# Patient Record
Sex: MALE | Race: WHITE | NOT HISPANIC OR LATINO | Employment: OTHER | URBAN - METROPOLITAN AREA
[De-identification: names, ages, dates, MRNs, and addresses within clinical notes are randomized per-mention and may not be internally consistent; named-entity substitution may affect disease eponyms.]

---

## 2023-06-20 ENCOUNTER — TELEPHONE (OUTPATIENT)
Dept: PRIMARY CARE | Facility: CLINIC | Age: 38
End: 2023-06-20

## 2023-06-20 NOTE — TELEPHONE ENCOUNTER
Spoke to patient and informed him that a faster option would be to go to [facility] care. I informed them of their services and told him it would be faster than making an appointment. Patient was in understanding and had no other questions or concerns. Patient was identified with full name and date of birth.       Emelina Smith MA

## 2023-06-20 NOTE — TELEPHONE ENCOUNTER
Problem: Non-Pressure Injury Wound  Goal: # Verbalizes understanding of wound and wound care  Description: If abnormality is a skin tear, avoid using tape on skin including transparent dressings. Document education using the patient education activity.  Outcome: Outcome Met, Continue evaluating goal progress toward completion     Problem: Non-Pressure Injury Wound  Goal: Participates in wound care activities  Outcome: Outcome Met, Continue evaluating goal progress toward completion     Problem: At Risk for Falls  Goal: # Patient does not fall  Outcome: Outcome Met, Continue evaluating goal progress toward completion     Problem: Activity Intolerance  Goal: # Tolerates activity for d/c setting with no clinical problems  Outcome: Outcome Met, Continue evaluating goal progress toward completion     Problem: Impaired Physical Mobility  Goal: # Bed mobility, ambulation, and ADLs are maintained or returned to baseline during hospitalization  Outcome: Outcome Met, Continue evaluating goal progress toward completion      Patient asking for referral for left wrist pain following weight training he would like to see an orthopedic or whomever [facility] would suggest to help. Patient states he does not need appointment, [facility] will just give him referral.    Please call and advise when in the system [phone]

## 2023-09-26 ENCOUNTER — OFFICE VISIT (OUTPATIENT)
Dept: PRIMARY CARE | Facility: CLINIC | Age: 38
End: 2023-09-26
Payer: COMMERCIAL

## 2023-09-26 DIAGNOSIS — Z23 ENCOUNTER FOR IMMUNIZATION: ICD-10-CM

## 2023-09-26 PROCEDURE — 90686 IIV4 VACC NO PRSV 0.5 ML IM: CPT | Performed by: INTERNAL MEDICINE

## 2023-09-26 PROCEDURE — 90471 IMMUNIZATION ADMIN: CPT | Performed by: INTERNAL MEDICINE

## 2023-09-26 NOTE — PROGRESS NOTES
Patient came into to get his Flu vaccine. Patient got it in the right arm and had no complaints.  Patient was able to get up and walk out of the office with no issues.      Emelina Smith MA

## 2024-03-23 ENCOUNTER — APPOINTMENT (OUTPATIENT)
Dept: RADIOLOGY | Facility: HOSPITAL | Age: 39
End: 2024-03-23
Payer: COMMERCIAL

## 2024-03-23 ENCOUNTER — HOSPITAL ENCOUNTER (OUTPATIENT)
Facility: HOSPITAL | Age: 39
Setting detail: OBSERVATION
Discharge: HOME | End: 2024-03-24
Attending: GENERAL PRACTICE | Admitting: INTERNAL MEDICINE
Payer: COMMERCIAL

## 2024-03-23 DIAGNOSIS — H53.2 DIPLOPIA: Primary | ICD-10-CM

## 2024-03-23 LAB
ALBUMIN SERPL BCP-MCNC: 4.7 G/DL (ref 3.4–5)
ALP SERPL-CCNC: 62 U/L (ref 33–120)
ALT SERPL W P-5'-P-CCNC: 44 U/L (ref 10–52)
ANION GAP SERPL CALC-SCNC: 13 MMOL/L (ref 10–20)
AST SERPL W P-5'-P-CCNC: 45 U/L (ref 9–39)
BASOPHILS # BLD AUTO: 0.05 X10*3/UL (ref 0–0.1)
BASOPHILS NFR BLD AUTO: 1.2 %
BILIRUB SERPL-MCNC: 1.1 MG/DL (ref 0–1.2)
BUN SERPL-MCNC: 14 MG/DL (ref 6–23)
CALCIUM SERPL-MCNC: 9.1 MG/DL (ref 8.6–10.3)
CHLORIDE SERPL-SCNC: 101 MMOL/L (ref 98–107)
CO2 SERPL-SCNC: 25 MMOL/L (ref 21–32)
CREAT SERPL-MCNC: 0.97 MG/DL (ref 0.5–1.3)
CRP SERPL-MCNC: <0.1 MG/DL
EGFRCR SERPLBLD CKD-EPI 2021: >90 ML/MIN/1.73M*2
EOSINOPHIL # BLD AUTO: 0.12 X10*3/UL (ref 0–0.7)
EOSINOPHIL NFR BLD AUTO: 2.8 %
ERYTHROCYTE [DISTWIDTH] IN BLOOD BY AUTOMATED COUNT: 12.9 % (ref 11.5–14.5)
ERYTHROCYTE [SEDIMENTATION RATE] IN BLOOD BY WESTERGREN METHOD: 24 MM/H (ref 0–15)
GLUCOSE SERPL-MCNC: 83 MG/DL (ref 74–99)
HCT VFR BLD AUTO: 44 % (ref 41–52)
HGB BLD-MCNC: 14.4 G/DL (ref 13.5–17.5)
IMM GRANULOCYTES # BLD AUTO: 0.01 X10*3/UL (ref 0–0.7)
IMM GRANULOCYTES NFR BLD AUTO: 0.2 % (ref 0–0.9)
LYMPHOCYTES # BLD AUTO: 1.69 X10*3/UL (ref 1.2–4.8)
LYMPHOCYTES NFR BLD AUTO: 39.8 %
MCH RBC QN AUTO: 27.7 PG (ref 26–34)
MCHC RBC AUTO-ENTMCNC: 32.7 G/DL (ref 32–36)
MCV RBC AUTO: 85 FL (ref 80–100)
MONOCYTES # BLD AUTO: 0.34 X10*3/UL (ref 0.1–1)
MONOCYTES NFR BLD AUTO: 8 %
NEUTROPHILS # BLD AUTO: 2.04 X10*3/UL (ref 1.2–7.7)
NEUTROPHILS NFR BLD AUTO: 48 %
NRBC BLD-RTO: 0 /100 WBCS (ref 0–0)
PLATELET # BLD AUTO: 279 X10*3/UL (ref 150–450)
POTASSIUM SERPL-SCNC: 3.9 MMOL/L (ref 3.5–5.3)
PROT SERPL-MCNC: 8.3 G/DL (ref 6.4–8.2)
RBC # BLD AUTO: 5.19 X10*6/UL (ref 4.5–5.9)
SODIUM SERPL-SCNC: 135 MMOL/L (ref 136–145)
WBC # BLD AUTO: 4.3 X10*3/UL (ref 4.4–11.3)

## 2024-03-23 PROCEDURE — 99222 1ST HOSP IP/OBS MODERATE 55: CPT | Performed by: INTERNAL MEDICINE

## 2024-03-23 PROCEDURE — 99285 EMERGENCY DEPT VISIT HI MDM: CPT | Mod: 25

## 2024-03-23 PROCEDURE — G0378 HOSPITAL OBSERVATION PER HR: HCPCS

## 2024-03-23 PROCEDURE — 2550000001 HC RX 255 CONTRASTS: Performed by: GENERAL PRACTICE

## 2024-03-23 PROCEDURE — 36415 COLL VENOUS BLD VENIPUNCTURE: CPT | Performed by: GENERAL PRACTICE

## 2024-03-23 PROCEDURE — 1100000001 HC PRIVATE ROOM DAILY

## 2024-03-23 PROCEDURE — 85652 RBC SED RATE AUTOMATED: CPT | Performed by: GENERAL PRACTICE

## 2024-03-23 PROCEDURE — 2500000004 HC RX 250 GENERAL PHARMACY W/ HCPCS (ALT 636 FOR OP/ED): Mod: JZ | Performed by: INTERNAL MEDICINE

## 2024-03-23 PROCEDURE — 85025 COMPLETE CBC W/AUTO DIFF WBC: CPT | Performed by: GENERAL PRACTICE

## 2024-03-23 PROCEDURE — 96374 THER/PROPH/DIAG INJ IV PUSH: CPT

## 2024-03-23 PROCEDURE — 86140 C-REACTIVE PROTEIN: CPT | Performed by: GENERAL PRACTICE

## 2024-03-23 PROCEDURE — 70470 CT HEAD/BRAIN W/O & W/DYE: CPT

## 2024-03-23 PROCEDURE — 80053 COMPREHEN METABOLIC PANEL: CPT | Performed by: GENERAL PRACTICE

## 2024-03-23 PROCEDURE — 2500000004 HC RX 250 GENERAL PHARMACY W/ HCPCS (ALT 636 FOR OP/ED): Mod: JZ | Performed by: GENERAL PRACTICE

## 2024-03-23 RX ORDER — NAPROXEN SODIUM 220 MG/1
81 TABLET, FILM COATED ORAL DAILY
Status: DISCONTINUED | OUTPATIENT
Start: 2024-03-24 | End: 2024-03-24 | Stop reason: HOSPADM

## 2024-03-23 RX ORDER — ONDANSETRON 4 MG/1
4 TABLET, FILM COATED ORAL EVERY 8 HOURS PRN
Status: DISCONTINUED | OUTPATIENT
Start: 2024-03-23 | End: 2024-03-24 | Stop reason: HOSPADM

## 2024-03-23 RX ORDER — TALC
3 POWDER (GRAM) TOPICAL DAILY
Status: DISCONTINUED | OUTPATIENT
Start: 2024-03-23 | End: 2024-03-24 | Stop reason: HOSPADM

## 2024-03-23 RX ORDER — ACETAMINOPHEN 325 MG/1
650 TABLET ORAL EVERY 4 HOURS PRN
Status: DISCONTINUED | OUTPATIENT
Start: 2024-03-23 | End: 2024-03-24 | Stop reason: HOSPADM

## 2024-03-23 RX ORDER — ACETAMINOPHEN 650 MG/1
650 SUPPOSITORY RECTAL EVERY 4 HOURS PRN
Status: DISCONTINUED | OUTPATIENT
Start: 2024-03-23 | End: 2024-03-24 | Stop reason: HOSPADM

## 2024-03-23 RX ORDER — ACETAMINOPHEN 160 MG/5ML
650 SOLUTION ORAL EVERY 4 HOURS PRN
Status: DISCONTINUED | OUTPATIENT
Start: 2024-03-23 | End: 2024-03-24 | Stop reason: HOSPADM

## 2024-03-23 RX ORDER — ONDANSETRON HYDROCHLORIDE 2 MG/ML
4 INJECTION, SOLUTION INTRAVENOUS EVERY 8 HOURS PRN
Status: DISCONTINUED | OUTPATIENT
Start: 2024-03-23 | End: 2024-03-24 | Stop reason: HOSPADM

## 2024-03-23 RX ORDER — PANTOPRAZOLE SODIUM 40 MG/10ML
40 INJECTION, POWDER, LYOPHILIZED, FOR SOLUTION INTRAVENOUS
Status: DISCONTINUED | OUTPATIENT
Start: 2024-03-24 | End: 2024-03-24 | Stop reason: HOSPADM

## 2024-03-23 RX ORDER — PANTOPRAZOLE SODIUM 40 MG/1
40 TABLET, DELAYED RELEASE ORAL
Status: DISCONTINUED | OUTPATIENT
Start: 2024-03-24 | End: 2024-03-24 | Stop reason: HOSPADM

## 2024-03-23 RX ADMIN — IOHEXOL 75 ML: 350 INJECTION, SOLUTION INTRAVENOUS at 18:19

## 2024-03-23 RX ADMIN — IMMUNE GLOBULIN INFUSION (HUMAN) 10 G: 100 INJECTION, SOLUTION INTRAVENOUS; SUBCUTANEOUS at 23:49

## 2024-03-23 RX ADMIN — IMMUNE GLOBULIN INFUSION (HUMAN) 50 G: 100 INJECTION, SOLUTION INTRAVENOUS; SUBCUTANEOUS at 21:44

## 2024-03-23 ASSESSMENT — PAIN SCALES - GENERAL
PAINLEVEL_OUTOF10: 0 - NO PAIN

## 2024-03-23 ASSESSMENT — VISUAL ACUITY
OU: 20/13
OD: 20/10
OS: 20/15

## 2024-03-23 ASSESSMENT — ENCOUNTER SYMPTOMS
ENDOCRINE NEGATIVE: 1
GASTROINTESTINAL NEGATIVE: 1
HEMATOLOGIC/LYMPHATIC NEGATIVE: 1
MUSCULOSKELETAL NEGATIVE: 1
NEUROLOGICAL NEGATIVE: 1
RESPIRATORY NEGATIVE: 1
CARDIOVASCULAR NEGATIVE: 1
PSYCHIATRIC NEGATIVE: 1
ALLERGIC/IMMUNOLOGIC NEGATIVE: 1
CONSTITUTIONAL NEGATIVE: 1

## 2024-03-23 ASSESSMENT — COLUMBIA-SUICIDE SEVERITY RATING SCALE - C-SSRS
2. HAVE YOU ACTUALLY HAD ANY THOUGHTS OF KILLING YOURSELF?: NO
1. IN THE PAST MONTH, HAVE YOU WISHED YOU WERE DEAD OR WISHED YOU COULD GO TO SLEEP AND NOT WAKE UP?: NO
6. HAVE YOU EVER DONE ANYTHING, STARTED TO DO ANYTHING, OR PREPARED TO DO ANYTHING TO END YOUR LIFE?: NO

## 2024-03-23 ASSESSMENT — PAIN - FUNCTIONAL ASSESSMENT
PAIN_FUNCTIONAL_ASSESSMENT: 0-10

## 2024-03-23 NOTE — ED PROVIDER NOTES
HPI   Chief Complaint   Patient presents with    Eye Problem     bilateral       HPI: 39-year-old male with a history of multifocal motor neuropathy presents with diplopia.  When he woke up this morning he noticed that he has diplopia when looking forward.  This resolves when he covers 1 eye and when he looks up or to the side.  He denies eye pain, headache, fever, chills and recent illness.  He does go for infusions every 5 weeks for treatment of his multifocal motor neuropathy.      Limitations to history: None  Independent Historians: Patient  External Records Reviewed: HIE, outpatient notes, inpatient notes  ------------------------------------------------------------------------------------------------------------------------------------------  ROS: a ten point review of systems was performed and was negative except as per HPI.  ------------------------------------------------------------------------------------------------------------------------------------------  PMH / PSH: as per HPI, otherwise reviewed in EMR  MEDS: as per HPI, otherwise reviewed in EMR  ALLERGIES: as per HPI, otherwise reviewed in EMR  SocH:  as per HPI, otherwise reviewed in EMR  FH:  as per HPI, otherwise reviewed in EMR  ------------------------------------------------------------------------------------------------------------------------------------------  Physical Exam:  VS: As documented in the triage note and EMR flowsheet from this visit was reviewed  General: Well appearing. No acute distress.   Eyes:  Extraocular movements grossly intact. No scleral icterus. No discharge.  Peripheral vision is intact.  PERRL  HEENT:  Normocephalic.  Atraumatic  Neck: Moves neck freely. No gross masses  CV: Regular rhythm. No murmurs, rubs or gallops   Resp: Clear to auscultation bilaterally. No respiratory distress.    GI: Soft, no masses, nontender. No rebound tenderness or guarding  MSK: Symmetric muscle bulk. No deformities. No lower  extremity edema.    Skin: Warm, dry, intact.   Neuro: No focal deficits.  A&O x3.   Psych: Appropriate for situation  ------------------------------------------------------------------------------------------------------------------------------------------  Hospital Course / Medical Decision Making:  Independent Interpretations: CT head with and without contrast  EKG as interpreted by me: N/A    MDM: This is a 39-year-old male with a history of multifocal motor neuropathy presenting for diplopia.  He is reporting diplopia primarily when he looks forward and down.  CT shows no acute intracranial process.  I spoke to the on-call neurologist, Dr. Quintanilla, who believes that this autoimmune process is affecting his cranial nerves.  He asked that we provide IV immunoglobulin and admit the patient for further evaluation.  The patient agreed to this and was admitted to the medicine service.    Discussion of Management with Other Providers:   I discussed the patient/results with: Emergency medicine team    Final diagnosis and disposition as below.         CT head w and wo IV contrast   Final Result    No evidence of acute cortical infarct or intracranial hemorrhage.          No evidence of intracranial hemorrhage or displaced skull fracture.          MACRO:    None          Signed by: Joseph Schoenberger 3/23/2024 7:00 PM    Dictation workstation:   [id number]                               No data recorded                   Patient History   Past Medical History:   Diagnosis Date    Abnormal levels of other serum enzymes 06/17/2022    Elevated liver enzymes    Chills (without fever) 01/19/2017    Chills    COVID-19     COVID-19    Lactose intolerance, unspecified 04/28/2014    Lactose intolerance    Pain in right shoulder 04/26/2016    Pain in joint of right shoulder    Pain, unspecified 01/19/2017    Body aches    Personal history of other diseases of the respiratory system 01/19/2017    History of sore throat    Personal  history of other diseases of the respiratory system 06/26/2014    History of upper respiratory infection    Personal history of other endocrine, nutritional and metabolic disease 05/06/2021    History of hyperlipidemia    Personal history of other specified conditions 12/10/2014    History of chronic fatigue    Radiculopathy, cervical region 11/25/2018    Cervical radiculopathy at C7    Radiculopathy, cervical region 11/29/2018    Cervical radiculopathy at C8     Past Surgical History:   Procedure Laterality Date    HERNIA REPAIR  10/31/2013    Hernia Repair    OTHER SURGICAL HISTORY  12/16/2021    Shoulder arthroscopy    OTHER SURGICAL HISTORY  12/16/2021    Biceps tenodesis procedure    OTHER SURGICAL HISTORY  12/16/2021    Colonoscopy    OTHER SURGICAL HISTORY  12/16/2021    Oral surgery    OTHER SURGICAL HISTORY  12/16/2021    Endoscopy     No family history on file.  Social History     Tobacco Use    Smoking status: Not on file    Smokeless tobacco: Not on file   Substance Use Topics    Alcohol use: Not on file    Drug use: Not on file       Physical Exam   ED Triage Vitals [03/23/24 1432]   Temperature Heart Rate Respirations BP   36.8 °C (98.2 °F) 64 18 152/88      Pulse Ox Temp Source Heart Rate Source Patient Position   98 % Temporal Monitor Sitting      BP Location FiO2 (%)     Left arm --       Physical Exam    ED Course & MDM   Diagnoses as of 03/24/24 1501   Diplopia       Medical Decision Making      Procedure  Procedures     Zuhair Martinez,   03/26/24 1702

## 2024-03-23 NOTE — ED TRIAGE NOTES
Pt c/o double vision in both eyes that started this morning. Denies injury. Pt does not wear contact/glasses. Denies dizziness/headache.

## 2024-03-24 ENCOUNTER — APPOINTMENT (OUTPATIENT)
Dept: RADIOLOGY | Facility: HOSPITAL | Age: 39
End: 2024-03-24
Payer: COMMERCIAL

## 2024-03-24 VITALS
SYSTOLIC BLOOD PRESSURE: 140 MMHG | BODY MASS INDEX: 31.08 KG/M2 | HEIGHT: 71 IN | TEMPERATURE: 98.6 F | WEIGHT: 222 LBS | HEART RATE: 84 BPM | DIASTOLIC BLOOD PRESSURE: 79 MMHG | RESPIRATION RATE: 18 BRPM | OXYGEN SATURATION: 100 %

## 2024-03-24 LAB
ANION GAP SERPL CALC-SCNC: 12 MMOL/L (ref 10–20)
BUN SERPL-MCNC: 13 MG/DL (ref 6–23)
CALCIUM SERPL-MCNC: 9 MG/DL (ref 8.6–10.3)
CHLORIDE SERPL-SCNC: 103 MMOL/L (ref 98–107)
CO2 SERPL-SCNC: 25 MMOL/L (ref 21–32)
CREAT SERPL-MCNC: 1.08 MG/DL (ref 0.5–1.3)
EGFRCR SERPLBLD CKD-EPI 2021: 90 ML/MIN/1.73M*2
ERYTHROCYTE [DISTWIDTH] IN BLOOD BY AUTOMATED COUNT: 12.9 % (ref 11.5–14.5)
GLUCOSE SERPL-MCNC: 99 MG/DL (ref 74–99)
HCT VFR BLD AUTO: 45.2 % (ref 41–52)
HGB BLD-MCNC: 14.9 G/DL (ref 13.5–17.5)
MCH RBC QN AUTO: 28.1 PG (ref 26–34)
MCHC RBC AUTO-ENTMCNC: 33 G/DL (ref 32–36)
MCV RBC AUTO: 85 FL (ref 80–100)
NRBC BLD-RTO: 0 /100 WBCS (ref 0–0)
PLATELET # BLD AUTO: 231 X10*3/UL (ref 150–450)
POTASSIUM SERPL-SCNC: 4.6 MMOL/L (ref 3.5–5.3)
RBC # BLD AUTO: 5.31 X10*6/UL (ref 4.5–5.9)
SODIUM SERPL-SCNC: 135 MMOL/L (ref 136–145)
WBC # BLD AUTO: 4.7 X10*3/UL (ref 4.4–11.3)

## 2024-03-24 PROCEDURE — A9575 INJ GADOTERATE MEGLUMI 0.1ML: HCPCS | Performed by: GENERAL PRACTICE

## 2024-03-24 PROCEDURE — 2550000001 HC RX 255 CONTRASTS: Performed by: GENERAL PRACTICE

## 2024-03-24 PROCEDURE — 36415 COLL VENOUS BLD VENIPUNCTURE: CPT | Performed by: INTERNAL MEDICINE

## 2024-03-24 PROCEDURE — 80048 BASIC METABOLIC PNL TOTAL CA: CPT | Performed by: INTERNAL MEDICINE

## 2024-03-24 PROCEDURE — 70553 MRI BRAIN STEM W/O & W/DYE: CPT | Performed by: RADIOLOGY

## 2024-03-24 PROCEDURE — 70553 MRI BRAIN STEM W/O & W/DYE: CPT

## 2024-03-24 PROCEDURE — 2500000001 HC RX 250 WO HCPCS SELF ADMINISTERED DRUGS (ALT 637 FOR MEDICARE OP): Performed by: INTERNAL MEDICINE

## 2024-03-24 PROCEDURE — 85027 COMPLETE CBC AUTOMATED: CPT | Performed by: INTERNAL MEDICINE

## 2024-03-24 PROCEDURE — 99223 1ST HOSP IP/OBS HIGH 75: CPT | Performed by: STUDENT IN AN ORGANIZED HEALTH CARE EDUCATION/TRAINING PROGRAM

## 2024-03-24 PROCEDURE — 2500000004 HC RX 250 GENERAL PHARMACY W/ HCPCS (ALT 636 FOR OP/ED): Mod: JZ | Performed by: STUDENT IN AN ORGANIZED HEALTH CARE EDUCATION/TRAINING PROGRAM

## 2024-03-24 PROCEDURE — G0378 HOSPITAL OBSERVATION PER HR: HCPCS

## 2024-03-24 PROCEDURE — 99239 HOSP IP/OBS DSCHRG MGMT >30: CPT | Performed by: HOSPITALIST

## 2024-03-24 RX ORDER — ACETAMINOPHEN 325 MG/1
975 TABLET ORAL ONCE
Status: COMPLETED | OUTPATIENT
Start: 2024-03-24 | End: 2024-03-24

## 2024-03-24 RX ORDER — GADOTERATE MEGLUMINE 376.9 MG/ML
20 INJECTION INTRAVENOUS
Status: COMPLETED | OUTPATIENT
Start: 2024-03-24 | End: 2024-03-24

## 2024-03-24 RX ADMIN — ACETAMINOPHEN 975 MG: 325 TABLET ORAL at 14:17

## 2024-03-24 RX ADMIN — ASPIRIN 81 MG CHEWABLE TABLET 81 MG: 81 TABLET CHEWABLE at 08:59

## 2024-03-24 RX ADMIN — GADOTERATE MEGLUMINE 20 ML: 376.9 INJECTION INTRAVENOUS at 11:15

## 2024-03-24 RX ADMIN — IMMUNE GLOBULIN INFUSION (HUMAN) 50 G: 100 INJECTION, SOLUTION INTRAVENOUS; SUBCUTANEOUS at 14:10

## 2024-03-24 RX ADMIN — PANTOPRAZOLE SODIUM 40 MG: 40 TABLET, DELAYED RELEASE ORAL at 06:27

## 2024-03-24 SDOH — SOCIAL STABILITY: SOCIAL INSECURITY: ABUSE: ADULT

## 2024-03-24 SDOH — SOCIAL STABILITY: SOCIAL INSECURITY: ARE THERE ANY APPARENT SIGNS OF INJURIES/BEHAVIORS THAT COULD BE RELATED TO ABUSE/NEGLECT?: NO

## 2024-03-24 SDOH — SOCIAL STABILITY: SOCIAL INSECURITY: HAVE YOU HAD THOUGHTS OF HARMING ANYONE ELSE?: NO

## 2024-03-24 SDOH — SOCIAL STABILITY: SOCIAL INSECURITY: DO YOU FEEL UNSAFE GOING BACK TO THE PLACE WHERE YOU ARE LIVING?: NO

## 2024-03-24 SDOH — SOCIAL STABILITY: SOCIAL INSECURITY: HAS ANYONE EVER THREATENED TO HURT YOUR FAMILY OR YOUR PETS?: NO

## 2024-03-24 SDOH — SOCIAL STABILITY: SOCIAL INSECURITY: DOES ANYONE TRY TO KEEP YOU FROM HAVING/CONTACTING OTHER FRIENDS OR DOING THINGS OUTSIDE YOUR HOME?: NO

## 2024-03-24 SDOH — SOCIAL STABILITY: SOCIAL INSECURITY: DO YOU FEEL ANYONE HAS EXPLOITED OR TAKEN ADVANTAGE OF YOU FINANCIALLY OR OF YOUR PERSONAL PROPERTY?: NO

## 2024-03-24 SDOH — SOCIAL STABILITY: SOCIAL INSECURITY: WERE YOU ABLE TO COMPLETE ALL THE BEHAVIORAL HEALTH SCREENINGS?: YES

## 2024-03-24 SDOH — SOCIAL STABILITY: SOCIAL INSECURITY: ARE YOU OR HAVE YOU BEEN THREATENED OR ABUSED PHYSICALLY, EMOTIONALLY, OR SEXUALLY BY ANYONE?: NO

## 2024-03-24 ASSESSMENT — ACTIVITIES OF DAILY LIVING (ADL)
JUDGMENT_ADEQUATE_SAFELY_COMPLETE_DAILY_ACTIVITIES: YES
BATHING: INDEPENDENT
WALKS IN HOME: INDEPENDENT
GROOMING: INDEPENDENT
HEARING - LEFT EAR: FUNCTIONAL
FEEDING YOURSELF: INDEPENDENT
LACK_OF_TRANSPORTATION: NO
DRESSING YOURSELF: INDEPENDENT
PATIENT'S MEMORY ADEQUATE TO SAFELY COMPLETE DAILY ACTIVITIES?: YES
HEARING - RIGHT EAR: FUNCTIONAL
TOILETING: INDEPENDENT
ADEQUATE_TO_COMPLETE_ADL: YES

## 2024-03-24 ASSESSMENT — COGNITIVE AND FUNCTIONAL STATUS - GENERAL
PATIENT BASELINE BEDBOUND: NO
DAILY ACTIVITIY SCORE: 24
MOBILITY SCORE: 24

## 2024-03-24 ASSESSMENT — LIFESTYLE VARIABLES
PRESCIPTION_ABUSE_PAST_12_MONTHS: NO
HOW MANY STANDARD DRINKS CONTAINING ALCOHOL DO YOU HAVE ON A TYPICAL DAY: PATIENT DOES NOT DRINK
HOW OFTEN DO YOU HAVE A DRINK CONTAINING ALCOHOL: NEVER
AUDIT-C TOTAL SCORE: 0
AUDIT-C TOTAL SCORE: 0
SKIP TO QUESTIONS 9-10: 1
HOW OFTEN DO YOU HAVE 6 OR MORE DRINKS ON ONE OCCASION: NEVER
SUBSTANCE_ABUSE_PAST_12_MONTHS: NO

## 2024-03-24 ASSESSMENT — VISUAL ACUITY: VA_NORMAL: 1

## 2024-03-24 ASSESSMENT — PATIENT HEALTH QUESTIONNAIRE - PHQ9
2. FEELING DOWN, DEPRESSED OR HOPELESS: NOT AT ALL
SUM OF ALL RESPONSES TO PHQ9 QUESTIONS 1 & 2: 0
1. LITTLE INTEREST OR PLEASURE IN DOING THINGS: NOT AT ALL

## 2024-03-24 NOTE — H&P
History Of Present Illness  Danny Neff is a 39 y.o. male with a past medical history of multifocal motor neuropathy who presents to [facility] complaining of double vision.  He endorses that when he woke up this morning he had double vision when looking forward.  The double vision resolved when he covers 1 eye and when he looks up or to the side.  He denies any eye pain headache fever chills or any recent illness.  He does get IVIG infusions every 5 weeks for treatment of his multifocal motor neuropathy.     Past Medical History  Past Medical History:   Diagnosis Date    Abnormal levels of other serum enzymes 06/17/2022    Elevated liver enzymes    Chills (without fever) 01/19/2017    Chills    COVID-19     COVID-19    Lactose intolerance, unspecified 04/28/2014    Lactose intolerance    Pain in right shoulder 04/26/2016    Pain in joint of right shoulder    Pain, unspecified 01/19/2017    Body aches    Personal history of other diseases of the respiratory system 01/19/2017    History of sore throat    Personal history of other diseases of the respiratory system 06/26/2014    History of upper respiratory infection    Personal history of other endocrine, nutritional and metabolic disease 05/06/2021    History of hyperlipidemia    Personal history of other specified conditions 12/10/2014    History of chronic fatigue    Radiculopathy, cervical region 11/25/2018    Cervical radiculopathy at C7    Radiculopathy, cervical region 11/29/2018    Cervical radiculopathy at C8        Surgical History  Past Surgical History:   Procedure Laterality Date    HERNIA REPAIR  10/31/2013    Hernia Repair    OTHER SURGICAL HISTORY  12/16/2021    Shoulder arthroscopy    OTHER SURGICAL HISTORY  12/16/2021    Biceps tenodesis procedure    OTHER SURGICAL HISTORY  12/16/2021    Colonoscopy    OTHER SURGICAL HISTORY  12/16/2021    Oral surgery    OTHER SURGICAL HISTORY  12/16/2021    Endoscopy         Social History  He has  no history on file for tobacco use, alcohol use, and drug use.    Family History  No family history on file.     Allergies  Patient has no known allergies.    Review of Systems   Constitutional: Negative.    HENT: Negative.     Eyes:  Positive for visual disturbance.   Respiratory: Negative.     Cardiovascular: Negative.    Gastrointestinal: Negative.    Endocrine: Negative.    Genitourinary: Negative.    Musculoskeletal: Negative.    Skin: Negative.    Allergic/Immunologic: Negative.    Neurological: Negative.    Hematological: Negative.    Psychiatric/Behavioral: Negative.     All other systems reviewed and are negative.       Physical Exam  Vitals and nursing note reviewed.   Constitutional:       Appearance: Normal appearance.   HENT:      Head: Normocephalic.      Right Ear: External ear normal.      Left Ear: External ear normal.      Nose: Nose normal.      Mouth/Throat:      Mouth: Mucous membranes are dry.      Pharynx: Oropharynx is clear.   Eyes:      Extraocular Movements: Extraocular movements intact.      Conjunctiva/sclera: Conjunctivae normal.      Pupils: Pupils are equal, round, and reactive to light.      Comments: Diplopia when looking forward results of CT looks off to the side   Cardiovascular:      Rate and Rhythm: Normal rate and regular rhythm.   Pulmonary:      Effort: Pulmonary effort is normal.      Breath sounds: Normal breath sounds.   Abdominal:      General: Abdomen is flat. Bowel sounds are normal.      Palpations: Abdomen is soft.   Musculoskeletal:         General: Normal range of motion.   Skin:     General: Skin is warm and dry.   Neurological:      General: No focal deficit present.      Mental Status: He is alert. Mental status is at baseline.   Psychiatric:         Mood and Affect: Mood normal.         Behavior: Behavior normal.          Last Recorded Vitals  Blood pressure 131/78, pulse 63, temperature 36.8 °C (98.2 °F), temperature source Temporal, resp. rate 18, height  "1.803 m (5' 11\"), weight 101 kg (222 lb), SpO2 95 %.    Relevant Results  Meds:  Scheduled medications  [START ON 3/24/2024] aspirin, 81 mg, oral, Daily  immune globulin (human), 50 g, intravenous, Once      Continuous medications     PRN medications     No current outpatient medications     Labs:  Results for orders placed or performed during the hospital encounter of 03/23/24 (from the past 24 hour(s))   CBC and Auto Differential   Result Value Ref Range    WBC 4.3 (L) 4.4 - 11.3 x10*3/uL    nRBC 0.0 0.0 - 0.0 /100 WBCs    RBC 5.19 4.50 - 5.90 x10*6/uL    Hemoglobin 14.4 13.5 - 17.5 g/dL    Hematocrit 44.0 41.0 - 52.0 %    MCV 85 80 - 100 fL    MCH 27.7 26.0 - 34.0 pg    MCHC 32.7 32.0 - 36.0 g/dL    RDW 12.9 11.5 - 14.5 %    Platelets 279 150 - 450 x10*3/uL    Neutrophils % 48.0 40.0 - 80.0 %    Immature Granulocytes %, Automated 0.2 0.0 - 0.9 %    Lymphocytes % 39.8 13.0 - 44.0 %    Monocytes % 8.0 2.0 - 10.0 %    Eosinophils % 2.8 0.0 - 6.0 %    Basophils % 1.2 0.0 - 2.0 %    Neutrophils Absolute 2.04 1.20 - 7.70 x10*3/uL    Immature Granulocytes Absolute, Automated 0.01 0.00 - 0.70 x10*3/uL    Lymphocytes Absolute 1.69 1.20 - 4.80 x10*3/uL    Monocytes Absolute 0.34 0.10 - 1.00 x10*3/uL    Eosinophils Absolute 0.12 0.00 - 0.70 x10*3/uL    Basophils Absolute 0.05 0.00 - 0.10 x10*3/uL   Comprehensive metabolic panel   Result Value Ref Range    Glucose 83 74 - 99 mg/dL    Sodium 135 (L) 136 - 145 mmol/L    Potassium 3.9 3.5 - 5.3 mmol/L    Chloride 101 98 - 107 mmol/L    Bicarbonate 25 21 - 32 mmol/L    Anion Gap 13 10 - 20 mmol/L    Urea Nitrogen 14 6 - 23 mg/dL    Creatinine 0.97 0.50 - 1.30 mg/dL    eGFR >90 >60 mL/min/1.73m*2    Calcium 9.1 8.6 - 10.3 mg/dL    Albumin 4.7 3.4 - 5.0 g/dL    Alkaline Phosphatase 62 33 - 120 U/L    Total Protein 8.3 (H) 6.4 - 8.2 g/dL    AST 45 (H) 9 - 39 U/L    Bilirubin, Total 1.1 0.0 - 1.2 mg/dL    ALT 44 10 - 52 U/L   Sedimentation rate, automated   Result Value Ref Range    " Sedimentation Rate 24 (H) 0 - 15 mm/h   C-reactive protein   Result Value Ref Range    C-Reactive Protein <0.10 <1.00 mg/dL      Imaging:  CT head w and wo IV contrast    Result Date: 3/23/2024  Interpreted By:  Schoenberger, Joseph, STUDY: CT HEAD W AND WO IV CONTRAST;  3/23/2024 6:36 pm   INDICATION: Signs/Symptoms:h/o autoimmune motor neuropathy, diplopia that started today.   COMPARISON: None.   ACCESSION NUMBER(S): [id number]   ORDERING CLINICIAN: PIERRE BRYSON   TECHNIQUE: Noncontrast axial CT scan of head was performed. Angled reformats in brain and bone windows were generated. The images were reviewed in bone, brain, blood and soft tissue windows.   FINDINGS: CSF Spaces: The ventricles, sulci and basal cisterns are within normal limits. There is no extraaxial fluid collection.   Parenchyma: There is normal vascular enhancement pattern with no evidence for an enhancing lesion. The grey-white differentiation is intact. There is no mass effect or midline shift.  There is no intracranial hemorrhage.   Calvarium: The calvarium is unremarkable.   Paranasal sinuses and mastoids: Visualized paranasal sinuses and mastoids are clear.       No evidence of acute cortical infarct or intracranial hemorrhage.   No evidence of intracranial hemorrhage or displaced skull fracture.   MACRO: None   Signed by: Joseph Schoenberger 3/23/2024 7:00 PM Dictation workstation:   [id number]       Assessment/Plan   Diplopia when looking for, resolves when he looks to the side or looks up.  May represent an exacerbation of his multifocal motor neuropathy  Plan:  Neurology consult  IVIG 50 g IV daily per neurology  MRI of the brain with and without contrast    DVT prophylaxis   plan:  Lovenox 40 mg subcutaneous daily  SCDs    Class I obesity BMI 30.9  Plan:  Lifestyle modification    I spent 60 minutes in the professional and overall care of this patient.      Richard Luz DO

## 2024-03-24 NOTE — CARE PLAN
The patient's goals for the shift include      The clinical goals for the shift include maintain safety and remain free from falls    Over the shift, the patient did not make progress toward the following goals.

## 2024-03-24 NOTE — CONSULTS
Subjective   History Of Present Illness  Danny Neff is a 39 y.o. male admitted on 3/23/2024 with Pmx of multifocal motor neuropathy admitted for new onset diplopila. Recent URI 3/7    On review of records, pt last seen by neuromuscular clinic 11/2022. Slowly a progressive right wrist and index finger weakness developed in /2017. 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 He was started on IVIG treatments with significant improvement in his right wrist drop and finger weakness. At that time he was doing very well on his pulse IVIG at home using 100 g divided over 2 days via peripheral vein every 5 weeks.     Patient is a good medical historian recounts his workup of this multifocal neuropathy for which she was essentially empirically trialed on IVIG that rapidly improved his symptoms.  He has not had a relapse in some time, though this predominantly for effects to his right tricep and right wrist extension.  He has never had symptoms in any other part of the body.  He has been in his usual state of health, though it may be a little bit more stressed at home with 2 kids (18 months and 3 years old) and 2 dogs including a new puppy 1 week ago.  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    In terms of the current hospitalization, patient states he had new onset double vision worse when looking down into the left associated with some eyestrain.  When he closes 1 eye, the visual deficit corrects.  There is no blurred vision, darkening of vision, facial droop, associated numbness, focal weakness or numbness or tingling in any extremity.    Denies drugs, alcohol, tobacco.  Works out 7 days a week.  Review of systems as noted above, otherwise negative.    Called last night from ER, at that time I recommended IVIG empirically given his history.    Past  "Medical History  Past Medical History:   Diagnosis Date    Abnormal levels of other serum enzymes 06/17/2022    Elevated liver enzymes    Chills (without fever) 01/19/2017    Chills    COVID-19     COVID-19    Lactose intolerance, unspecified 04/28/2014    Lactose intolerance    Pain in right shoulder 04/26/2016    Pain in joint of right shoulder    Pain, unspecified 01/19/2017    Body aches    Personal history of other diseases of the respiratory system 01/19/2017    History of sore throat    Personal history of other diseases of the respiratory system 06/26/2014    History of upper respiratory infection    Personal history of other endocrine, nutritional and metabolic disease 05/06/2021    History of hyperlipidemia    Personal history of other specified conditions 12/10/2014    History of chronic fatigue    Radiculopathy, cervical region 11/25/2018    Cervical radiculopathy at C7    Radiculopathy, cervical region 11/29/2018    Cervical radiculopathy at C8     Surgical History  Past Surgical History:   Procedure Laterality Date    HERNIA REPAIR  10/31/2013    Hernia Repair    OTHER SURGICAL HISTORY  12/16/2021    Shoulder arthroscopy    OTHER SURGICAL HISTORY  12/16/2021    Biceps tenodesis procedure    OTHER SURGICAL HISTORY  12/16/2021    Colonoscopy    OTHER SURGICAL HISTORY  12/16/2021    Oral surgery    OTHER SURGICAL HISTORY  12/16/2021    Endoscopy     Social History     Allergies  Reviewed in EMR       Objective   Last Recorded Vitals  Blood pressure 132/80, pulse 82, temperature 36.7 °C (98.1 °F), temperature source Temporal, resp. rate 18, height 1.803 m (5' 11\"), weight 101 kg (222 lb), SpO2 96 %.    Scheduled medications  aspirin, 81 mg, oral, Daily  melatonin, 3 mg, oral, Daily  pantoprazole, 40 mg, oral, Daily before breakfast   Or  pantoprazole, 40 mg, intravenous, Daily before breakfast      Continuous medications     PRN medications  PRN medications: acetaminophen **OR** acetaminophen **OR** " acetaminophen, ondansetron **OR** ondansetron         Exam   Neurological Exam  Mental Status  Awake, alert and oriented to person, place and time. Recent and remote memory are intact. Speech is normal. Language is fluent with no aphasia. Attention and concentration are normal.    Cranial Nerves  CN II: Visual acuity is normal. Visual fields full to confrontation.  CN III, IV, VI: Normal lids and orbits bilaterally. Pupils equal round and reactive to light bilaterally. There is a subtle asymmetry of left eye abduction with associated diplopia suggestive of a 6th nerve/abducens palsy.  CN V: Facial sensation is normal.  CN VII: Full and symmetric facial movement.  CN VIII: Hearing is normal.  CN IX, X: Palate elevates symmetrically  CN XI: Shoulder shrug strength is normal.  CN XII: Tongue midline without atrophy or fasciculations.    Motor  Normal muscle bulk throughout. No fasciculations present. Normal muscle tone. No abnormal involuntary movements. Strength is 5/5 throughout all four extremities.    Sensory  Light touch is normal in upper and lower extremities.     Reflexes                                            Right                      Left  Brachioradialis                    2+                         2+  Biceps                                 2+                         2+  Triceps                                2+                         2+  Patellar                                2+                         2+    Right pathological reflexes: Ankle clonus absent.  Left pathological reflexes: Ankle clonus absent.    Coordination  Right: Finger-to-nose normal.Left: Finger-to-nose normal.    Physical Exam  Eyes:      General: Lids are normal.      Pupils: Pupils are equal, round, and reactive to light.   Neurological:      Motor: Motor strength is normal.     Deep Tendon Reflexes:      Reflex Scores:       Tricep reflexes are 2+ on the right side and 2+ on the left side.       Bicep reflexes are 2+ on the  right side and 2+ on the left side.       Brachioradialis reflexes are 2+ on the right side and 2+ on the left side.       Patellar reflexes are 2+ on the right side and 2+ on the left side.  Psychiatric:         Speech: Speech normal.         Relevant Results    Lab Results  Results from last 72 hours   Lab Units 03/24/24  0703 03/23/24  1606   GLUCOSE mg/dL 99 83   SODIUM mmol/L 135* 135*   POTASSIUM mmol/L 4.6 3.9   CHLORIDE mmol/L 103 101   CO2 mmol/L 25 25   ANION GAP mmol/L 12 13   BUN mg/dL 13 14   CREATININE mg/dL 1.08 0.97   EGFR mL/min/1.73m*2 90 >90   CALCIUM mg/dL 9.0 9.1   ALBUMIN g/dL  --  4.7      Results from last 72 hours   Lab Units 03/24/24  0703 03/23/24  1606   WBC AUTO x10*3/uL 4.7 4.3*   NRBC AUTO /100 WBCs 0.0 0.0   RBC AUTO x10*6/uL 5.31 5.19   HEMOGLOBIN g/dL 14.9 14.4   HEMATOCRIT % 45.2 44.0   MCV fL 85 85   MCH pg 28.1 27.7   MCHC g/dL 33.0 32.7   RDW % 12.9 12.9   PLATELETS AUTO x10*3/uL 231 279        MR brain w and wo IV contrast   Final Result   Minimal subcortical white matter hyperintensities are nonspecific.   Differential considerations include sequela of migraine headaches,   chronic hypertension, vasculitis, demyelination or other causes.   Small focus of signal abnormality within the anteromedial right   temporal lobe may reflect sequela of similar etiology. There is no   associated enhancement or diffusion restriction. Follow-up imaging   may be acquired as clinically warranted.        Signed by: Tomasa Aquino 3/24/2024 11:26 AM   Dictation workstation:   [id number]      CT head w and wo IV contrast   Final Result   No evidence of acute cortical infarct or intracranial hemorrhage.        No evidence of intracranial hemorrhage or displaced skull fracture.        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  Symptoms have improved following 1 dose of IVIG on 3/23 without other neurologic symptoms suggestive of alternate etiology such as multiple sclerosis.  On my  review of MRI, there are some punctate FLAIR hyperintensities without T1 lesions or significant periventricular hyperintensity.  This study is motion degraded.    Continue to treat with second course of IVIG today (3/24)  If symptoms are stable/continue to improve, okay to return home  Pretreatment with Tylenol for headache  Close follow-up with neurology clinic  if symptoms persist or new neurologic deficits are seen, low threshold to repeat MRI with and without contrast, preferably 3T, and include sagittal T2 FLAIR         I spent 80 minutes in the professional and overall care of this patient.      Thuan Quintanilla MD

## 2024-03-25 ENCOUNTER — PATIENT OUTREACH (OUTPATIENT)
Dept: CARE COORDINATION | Facility: CLINIC | Age: 39
End: 2024-03-25

## 2024-03-25 NOTE — DISCHARGE SUMMARY
Discharge Diagnosis  #Diplopia  #Suspected left 6th nerve palsy  #Multifocal motor neuropathy    Issues Requiring Follow-Up  - op follow up with neurology    Discharge Meds     Your medication list      as of March 24, 2024  6:23 PM     You have not been prescribed any medications.         Test Results Pending At Discharge  Pending Labs       No current pending labs.            Hospital Course  39 y.o. male with a past medical history of multifocal motor neuropathy who presents to [facility] complaining of double vision.  He endorses that when he woke up this morning he had double vision when looking forward.  The double vision resolved when he covers 1 eye and when he looks up or to the side.  He denies any eye pain headache fever chills or any recent illness.  He does get IVIG infusions every 5 weeks for treatment of his multifocal motor neuropathy.     Neurology consulted. Given IVIG on 3/23 and 3/24 with improvement in symptoms. Plan to dc home with OP follow up with neurology. If symptoms persist or new neurologic deficits are seen, low threshold to repeat MRI with and without contrast, preferably 3T, and include sagittal T2 FLAIR.    Pertinent Physical Exam At Time of Discharge  Physical Exam  Vitals and nursing note reviewed.   HENT:      Mouth/Throat:      Mouth: Mucous membranes are moist.      Pharynx: Oropharynx is clear.   Eyes:      Extraocular Movements: Extraocular movements intact.      Pupils: Pupils are equal, round, and reactive to light.      Comments: Double vision improved today   Cardiovascular:      Rate and Rhythm: Normal rate and regular rhythm.   Pulmonary:      Effort: Pulmonary effort is normal.   Abdominal:      Palpations: Abdomen is soft.   Neurological:      Mental Status: He is alert and oriented to person, place, and time.      Cranial Nerves: No cranial nerve deficit.      Sensory: No sensory deficit.      Motor: No weakness.         Outpatient Follow-Up  No future  appointments.      Brett Berg, DO

## 2024-04-22 ENCOUNTER — OFFICE VISIT (OUTPATIENT)
Dept: NEUROLOGY | Facility: CLINIC | Age: 39
End: 2024-04-22
Payer: COMMERCIAL

## 2024-04-22 VITALS
HEART RATE: 70 BPM | WEIGHT: 218 LBS | BODY MASS INDEX: 30.52 KG/M2 | SYSTOLIC BLOOD PRESSURE: 122 MMHG | DIASTOLIC BLOOD PRESSURE: 78 MMHG | RESPIRATION RATE: 18 BRPM | HEIGHT: 71 IN

## 2024-04-22 DIAGNOSIS — G61.82 MULTIFOCAL MOTOR NEUROPATHY (MULTI): Primary | ICD-10-CM

## 2024-04-22 DIAGNOSIS — H53.2 DIPLOPIA: ICD-10-CM

## 2024-04-22 PROCEDURE — 1036F TOBACCO NON-USER: CPT | Performed by: PSYCHIATRY & NEUROLOGY

## 2024-04-22 PROCEDURE — 99215 OFFICE O/P EST HI 40 MIN: CPT | Performed by: PSYCHIATRY & NEUROLOGY

## 2024-04-22 RX ORDER — ACETAMINOPHEN 500 MG
4000 TABLET ORAL DAILY
COMMUNITY

## 2024-04-22 ASSESSMENT — ENCOUNTER SYMPTOMS
OCCASIONAL FEELINGS OF UNSTEADINESS: 0
DEPRESSION: 0
LOSS OF SENSATION IN FEET: 0

## 2024-04-22 ASSESSMENT — COLUMBIA-SUICIDE SEVERITY RATING SCALE - C-SSRS
1. IN THE PAST MONTH, HAVE YOU WISHED YOU WERE DEAD OR WISHED YOU COULD GO TO SLEEP AND NOT WAKE UP?: NO
2. HAVE YOU ACTUALLY HAD ANY THOUGHTS OF KILLING YOURSELF?: NO
6. HAVE YOU EVER DONE ANYTHING, STARTED TO DO ANYTHING, OR PREPARED TO DO ANYTHING TO END YOUR LIFE?: NO

## 2024-04-22 ASSESSMENT — PATIENT HEALTH QUESTIONNAIRE - PHQ9
SUM OF ALL RESPONSES TO PHQ9 QUESTIONS 1 AND 2: 0
1. LITTLE INTEREST OR PLEASURE IN DOING THINGS: NOT AT ALL
2. FEELING DOWN, DEPRESSED OR HOPELESS: NOT AT ALL

## 2024-04-22 ASSESSMENT — PAIN SCALES - GENERAL: PAINLEVEL: 0-NO PAIN

## 2024-04-22 NOTE — PROGRESS NOTES
Date of Service: 4/22/2024  Patient: Danny Neff  MRN: [id number]    History of Present Illness:   Mr. Danny Neff is a 39 year old man whom whom is here today for semiurgent follow-up appointment for multifocal motor neuropathy and recent onset diplopia. He was previously seen 6/13/2022.  He arrives alone.     He developed sudden binocular diplopia several weeks ago with no headache or other associated symptoms.  He went to the emergency room and was admitted to [facility] where he was seen by neurology and found to have a left lateral rectus weakness suggesting a left 6th nerve palsy.  He was given extra dose of IVIG which was at his midcycle.  It was presumed that this was related to his multifocal motor neuropathy.  The double vision slowly resolved over the next 3 to 5 days and has not recurred.  He denied any associated droopy eyelid, slurred speech or swallowing difficulties.    Since his previous appointment, he continues doing very well on his current pulse IVIG in terms of strength in his right wrist and finger extensors in between doses.   He denies any new weakness and continues to workout in the gym and work with a computer keyboard. Occasional cramps in his right tricep and some weakness to his index finger often develop at week 5.  Symptoms also tend to exacerbate when he is sick but resolves following with no residual symptoms present.      He continues to receive his IVIG 100 G divided over 2 days every 5 weeks via peripheral vein, through [facility].  A mild headache that develops with IVIG infusions often last 2-3 days.  He is due for his next dose 5/13 and 5/14.      To re-cap: Slowly a progressive right wrist and index finger weakness developed in /2017. 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 He was started on IVIG treatments  "with significant improvement in his right wrist drop and finger weakness.      Otherwise, the past medical history, surgical history and systems were reviewed. There are no significant changes.    /78   Pulse 70   Resp 18   Ht 1.803 m (5' 11\")   Wt 98.9 kg (218 lb)   BMI 30.40 kg/m²     Neuromuscular Exam:     Eyelids are normal with no ptosis despite 1 minute of sustained upgaze. Extraocular muscles are full with no subjective double vision. Eye closure strength is normal. Mouth closure strength is normal. Neck flexor and extensor strengths are normal. There is no dysarthria. Proximal muscle strength is normal.  The motor examination reveals normal muscle tone and bulk. Manual muscle examination is normal in all four extremities including right wrist and fingers extensors. The deep tendon reflexes are +2 and symmetrical. The gait is normal.      Results:    I personally reviewed the images of his MRI of the brain done in March 2024.  This was normal except for two small subcortical white matter lesions.  There were no definite identifiable lesion in the mendoza.    Impression:    Mr. Neff is a 36 year-old man with multifocal motor neuropathy presenting mostly with right radial neuropathy with conduction block. He has responded very well to pulse IVIG.    He developed double vision with weakness of the lateral rectus on neurological examination in March 2024.  This resolved in 4 to 5 days.  His MRI of the brain was normal except for 2 small subcortical white matter lesions.  There were no periventricular or infratentorial lesions.  It is not clear as to the cause of his transient double vision.  I will obtain acetylcholine receptor and MusK antibodies to exclude the possibility of ocular myasthenia.  I asked him to call me back if he has any recurring symptoms.     Otherwise, his multifocal motor neuropathy, presenting as right radial neuropathy with conduction block continue to respond very well to pulse " IVIG at home using 100 g divided over 2 days via peripheral vein every 5 weeks. He is doing well in terms of strength in his right wrist and finger extensors in between doses, with occasional cramps in right triceps. He has mild headache with IVIG infusion last 2-3 days.  His venous access has been very good.     Plan:   -Continue pulse IVIG at home using 100 g divided over 2 days via peripheral vein every 5 weeks  -Obtain acetylcholine receptor and MusK antibodies  -Return in 6 months and earlier if needed.  Call if there is any recurring symptoms.           Keron Wellington M.D., F.A.C.P.   Director, [facility] & EM[facility] laboratory   [facility]   [facility]   Professor of Neurology   [facility] [employer]     Problem List Items Addressed This Visit          Neurologic Problems    Multifocal motor neuropathy (Multi) - Primary       Other    RESOLVED: Diplopia    Relevant Orders    Acetylcholine Receptor Binding Antibody    Muscle-Specific Kinase (MuSK) Antibody, IgG        I personally spent 45 minutes on the day of the visit completing the review of the medical record and outside records, obtaining history and performing an appropriate physical exam, patient care, counseling and education, placing orders, independently reviewing results, communicating with the patient and other providers, coordinating care and performing appropriate clinical documentation.

## 2024-04-25 PROBLEM — H53.2 DIPLOPIA: Status: RESOLVED | Noted: 2024-03-23 | Resolved: 2024-04-25

## 2024-04-25 PROBLEM — G61.82 MULTIFOCAL MOTOR NEUROPATHY (MULTI): Status: ACTIVE | Noted: 2024-04-25

## 2024-08-05 ENCOUNTER — TELEPHONE (OUTPATIENT)
Dept: PRIMARY CARE | Facility: CLINIC | Age: 39
End: 2024-08-05
Payer: COMMERCIAL

## 2024-08-06 DIAGNOSIS — M54.9 BACK PAIN, UNSPECIFIED BACK LOCATION, UNSPECIFIED BACK PAIN LATERALITY, UNSPECIFIED CHRONICITY: ICD-10-CM

## 2024-08-06 NOTE — TELEPHONE ENCOUNTER
Called patient and left a voice mail informing him that the referral was put in the system.    Emelina Smith MA

## 2024-10-31 ASSESSMENT — PROMIS GLOBAL HEALTH SCALE
RATE_QUALITY_OF_LIFE: EXCELLENT
CARRYOUT_PHYSICAL_ACTIVITIES: COMPLETELY
RATE_AVERAGE_FATIGUE: MILD
RATE_AVERAGE_PAIN: 0
RATE_GENERAL_HEALTH: VERY GOOD
RATE_SOCIAL_SATISFACTION: VERY GOOD
EMOTIONAL_PROBLEMS: SOMETIMES
RATE_MENTAL_HEALTH: EXCELLENT
CARRYOUT_SOCIAL_ACTIVITIES: VERY GOOD
RATE_PHYSICAL_HEALTH: VERY GOOD

## 2024-11-01 ENCOUNTER — APPOINTMENT (OUTPATIENT)
Dept: PRIMARY CARE | Facility: CLINIC | Age: 39
End: 2024-11-01
Payer: COMMERCIAL

## 2024-11-01 VITALS
OXYGEN SATURATION: 97 % | BODY MASS INDEX: 31.92 KG/M2 | SYSTOLIC BLOOD PRESSURE: 128 MMHG | DIASTOLIC BLOOD PRESSURE: 88 MMHG | HEIGHT: 71 IN | HEART RATE: 63 BPM | TEMPERATURE: 98.6 F | WEIGHT: 228 LBS

## 2024-11-01 DIAGNOSIS — E78.5 DYSLIPIDEMIA: ICD-10-CM

## 2024-11-01 DIAGNOSIS — Z00.00 ROUTINE GENERAL MEDICAL EXAMINATION AT A HEALTH CARE FACILITY: Primary | ICD-10-CM

## 2024-11-01 DIAGNOSIS — Z23 ENCOUNTER FOR PREVNAR PNEUMOCOCCAL VACCINATION: ICD-10-CM

## 2024-11-01 DIAGNOSIS — Z51.81 ENCOUNTER FOR MEDICATION MONITORING: ICD-10-CM

## 2024-11-01 DIAGNOSIS — R73.9 HYPERGLYCEMIA: ICD-10-CM

## 2024-11-01 DIAGNOSIS — E55.9 VITAMIN D DEFICIENCY: ICD-10-CM

## 2024-11-01 DIAGNOSIS — Z23 NEED FOR INFLUENZA VACCINATION: ICD-10-CM

## 2024-11-01 PROCEDURE — 90471 IMMUNIZATION ADMIN: CPT | Performed by: STUDENT IN AN ORGANIZED HEALTH CARE EDUCATION/TRAINING PROGRAM

## 2024-11-01 PROCEDURE — 90472 IMMUNIZATION ADMIN EACH ADD: CPT | Performed by: STUDENT IN AN ORGANIZED HEALTH CARE EDUCATION/TRAINING PROGRAM

## 2024-11-01 PROCEDURE — 3008F BODY MASS INDEX DOCD: CPT | Performed by: STUDENT IN AN ORGANIZED HEALTH CARE EDUCATION/TRAINING PROGRAM

## 2024-11-01 PROCEDURE — 90656 IIV3 VACC NO PRSV 0.5 ML IM: CPT | Performed by: STUDENT IN AN ORGANIZED HEALTH CARE EDUCATION/TRAINING PROGRAM

## 2024-11-01 PROCEDURE — 90677 PCV20 VACCINE IM: CPT | Performed by: STUDENT IN AN ORGANIZED HEALTH CARE EDUCATION/TRAINING PROGRAM

## 2024-11-01 PROCEDURE — 1036F TOBACCO NON-USER: CPT | Performed by: STUDENT IN AN ORGANIZED HEALTH CARE EDUCATION/TRAINING PROGRAM

## 2024-11-01 PROCEDURE — 99395 PREV VISIT EST AGE 18-39: CPT | Performed by: STUDENT IN AN ORGANIZED HEALTH CARE EDUCATION/TRAINING PROGRAM

## 2024-11-01 ASSESSMENT — PATIENT HEALTH QUESTIONNAIRE - PHQ9
2. FEELING DOWN, DEPRESSED OR HOPELESS: NOT AT ALL
1. LITTLE INTEREST OR PLEASURE IN DOING THINGS: NOT AT ALL
SUM OF ALL RESPONSES TO PHQ9 QUESTIONS 1 AND 2: 0

## 2024-11-01 ASSESSMENT — ENCOUNTER SYMPTOMS
DIZZINESS: 0
SHORTNESS OF BREATH: 0
EYE PAIN: 0
UNEXPECTED WEIGHT CHANGE: 0
OCCASIONAL FEELINGS OF UNSTEADINESS: 0
ABDOMINAL PAIN: 0
RHINORRHEA: 0
DEPRESSION: 0
LIGHT-HEADEDNESS: 0
HEMATURIA: 0
CHILLS: 0
LOSS OF SENSATION IN FEET: 0
FEVER: 0

## 2024-11-01 ASSESSMENT — PAIN SCALES - GENERAL: PAINLEVEL_OUTOF10: 0-NO PAIN

## 2025-01-06 ENCOUNTER — LAB (OUTPATIENT)
Dept: LAB | Facility: LAB | Age: 40
End: 2025-01-06
Payer: COMMERCIAL

## 2025-01-06 ENCOUNTER — OFFICE VISIT (OUTPATIENT)
Dept: PRIMARY CARE | Facility: CLINIC | Age: 40
End: 2025-01-06
Payer: COMMERCIAL

## 2025-01-06 VITALS
WEIGHT: 229.8 LBS | DIASTOLIC BLOOD PRESSURE: 88 MMHG | BODY MASS INDEX: 32.17 KG/M2 | OXYGEN SATURATION: 95 % | HEART RATE: 84 BPM | TEMPERATURE: 98.9 F | SYSTOLIC BLOOD PRESSURE: 136 MMHG | HEIGHT: 71 IN

## 2025-01-06 DIAGNOSIS — R73.9 HYPERGLYCEMIA: ICD-10-CM

## 2025-01-06 DIAGNOSIS — Z00.00 ROUTINE GENERAL MEDICAL EXAMINATION AT A HEALTH CARE FACILITY: ICD-10-CM

## 2025-01-06 DIAGNOSIS — R53.83 OTHER FATIGUE: ICD-10-CM

## 2025-01-06 DIAGNOSIS — J18.9 ATYPICAL PNEUMONIA: ICD-10-CM

## 2025-01-06 DIAGNOSIS — H53.2 DIPLOPIA: ICD-10-CM

## 2025-01-06 DIAGNOSIS — E55.9 VITAMIN D DEFICIENCY: ICD-10-CM

## 2025-01-06 DIAGNOSIS — Z51.81 ENCOUNTER FOR MEDICATION MONITORING: ICD-10-CM

## 2025-01-06 DIAGNOSIS — G61.82 MULTIFOCAL MOTOR NEUROPATHY (MULTI): Primary | ICD-10-CM

## 2025-01-06 DIAGNOSIS — E78.5 DYSLIPIDEMIA: ICD-10-CM

## 2025-01-06 LAB
ALBUMIN SERPL BCP-MCNC: 4.6 G/DL (ref 3.4–5)
ALP SERPL-CCNC: 105 U/L (ref 33–120)
ALT SERPL W P-5'-P-CCNC: 58 U/L (ref 10–52)
ANION GAP SERPL CALC-SCNC: 12 MMOL/L (ref 10–20)
AST SERPL W P-5'-P-CCNC: 45 U/L (ref 9–39)
BASOPHILS # BLD AUTO: 0.05 X10*3/UL (ref 0–0.1)
BASOPHILS NFR BLD AUTO: 0.9 %
BILIRUB SERPL-MCNC: 1 MG/DL (ref 0–1.2)
BUN SERPL-MCNC: 13 MG/DL (ref 6–23)
CALCIUM SERPL-MCNC: 9.5 MG/DL (ref 8.6–10.3)
CHLORIDE SERPL-SCNC: 101 MMOL/L (ref 98–107)
CHOLEST SERPL-MCNC: 272 MG/DL (ref 0–199)
CHOLESTEROL/HDL RATIO: 6.5
CO2 SERPL-SCNC: 28 MMOL/L (ref 21–32)
CREAT SERPL-MCNC: 0.94 MG/DL (ref 0.5–1.3)
EGFRCR SERPLBLD CKD-EPI 2021: >90 ML/MIN/1.73M*2
EOSINOPHIL # BLD AUTO: 0.14 X10*3/UL (ref 0–0.7)
EOSINOPHIL NFR BLD AUTO: 2.4 %
ERYTHROCYTE [DISTWIDTH] IN BLOOD BY AUTOMATED COUNT: 13.7 % (ref 11.5–14.5)
GLUCOSE SERPL-MCNC: 85 MG/DL (ref 74–99)
HCT VFR BLD AUTO: 41.7 % (ref 41–52)
HDLC SERPL-MCNC: 42 MG/DL
HETEROPH AB SERPLBLD QL IA.RAPID: NEGATIVE
HGB BLD-MCNC: 13.7 G/DL (ref 13.5–17.5)
IMM GRANULOCYTES # BLD AUTO: 0.02 X10*3/UL (ref 0–0.7)
IMM GRANULOCYTES NFR BLD AUTO: 0.3 % (ref 0–0.9)
LDLC SERPL CALC-MCNC: 163 MG/DL
LYMPHOCYTES # BLD AUTO: 1.57 X10*3/UL (ref 1.2–4.8)
LYMPHOCYTES NFR BLD AUTO: 27 %
MCH RBC QN AUTO: 27.3 PG (ref 26–34)
MCHC RBC AUTO-ENTMCNC: 32.9 G/DL (ref 32–36)
MCV RBC AUTO: 83 FL (ref 80–100)
MONOCYTES # BLD AUTO: 0.42 X10*3/UL (ref 0.1–1)
MONOCYTES NFR BLD AUTO: 7.2 %
NEUTROPHILS # BLD AUTO: 3.62 X10*3/UL (ref 1.2–7.7)
NEUTROPHILS NFR BLD AUTO: 62.2 %
NON HDL CHOLESTEROL: 230 MG/DL (ref 0–149)
NRBC BLD-RTO: 0 /100 WBCS (ref 0–0)
PLATELET # BLD AUTO: 341 X10*3/UL (ref 150–450)
POTASSIUM SERPL-SCNC: 4.5 MMOL/L (ref 3.5–5.3)
PROT SERPL-MCNC: 8.2 G/DL (ref 6.4–8.2)
RBC # BLD AUTO: 5.02 X10*6/UL (ref 4.5–5.9)
SODIUM SERPL-SCNC: 136 MMOL/L (ref 136–145)
TRIGL SERPL-MCNC: 335 MG/DL (ref 0–149)
TSH SERPL-ACNC: 3.52 MIU/L (ref 0.44–3.98)
VLDL: 67 MG/DL (ref 0–40)
WBC # BLD AUTO: 5.8 X10*3/UL (ref 4.4–11.3)

## 2025-01-06 PROCEDURE — 80061 LIPID PANEL: CPT

## 2025-01-06 PROCEDURE — 82306 VITAMIN D 25 HYDROXY: CPT

## 2025-01-06 PROCEDURE — 1036F TOBACCO NON-USER: CPT | Performed by: STUDENT IN AN ORGANIZED HEALTH CARE EDUCATION/TRAINING PROGRAM

## 2025-01-06 PROCEDURE — 36415 COLL VENOUS BLD VENIPUNCTURE: CPT

## 2025-01-06 PROCEDURE — 83519 RIA NONANTIBODY: CPT

## 2025-01-06 PROCEDURE — 83036 HEMOGLOBIN GLYCOSYLATED A1C: CPT

## 2025-01-06 PROCEDURE — 85025 COMPLETE CBC W/AUTO DIFF WBC: CPT

## 2025-01-06 PROCEDURE — 84443 ASSAY THYROID STIM HORMONE: CPT

## 2025-01-06 PROCEDURE — 99214 OFFICE O/P EST MOD 30 MIN: CPT | Performed by: STUDENT IN AN ORGANIZED HEALTH CARE EDUCATION/TRAINING PROGRAM

## 2025-01-06 PROCEDURE — 80053 COMPREHEN METABOLIC PANEL: CPT

## 2025-01-06 PROCEDURE — 86308 HETEROPHILE ANTIBODY SCREEN: CPT

## 2025-01-06 PROCEDURE — 3008F BODY MASS INDEX DOCD: CPT | Performed by: STUDENT IN AN ORGANIZED HEALTH CARE EDUCATION/TRAINING PROGRAM

## 2025-01-06 RX ORDER — DOXYCYCLINE 100 MG/1
100 CAPSULE ORAL 2 TIMES DAILY
Qty: 10 CAPSULE | Refills: 0 | Status: SHIPPED | OUTPATIENT
Start: 2025-01-06 | End: 2025-01-11

## 2025-01-06 ASSESSMENT — PATIENT HEALTH QUESTIONNAIRE - PHQ9
2. FEELING DOWN, DEPRESSED OR HOPELESS: NOT AT ALL
SUM OF ALL RESPONSES TO PHQ9 QUESTIONS 1 AND 2: 0
1. LITTLE INTEREST OR PLEASURE IN DOING THINGS: NOT AT ALL

## 2025-01-06 ASSESSMENT — ENCOUNTER SYMPTOMS
RHINORRHEA: 0
WHEEZING: 0
COUGH: 0
OCCASIONAL FEELINGS OF UNSTEADINESS: 0
DEPRESSION: 0
SHORTNESS OF BREATH: 0
LOSS OF SENSATION IN FEET: 0
CHEST TIGHTNESS: 0

## 2025-01-06 ASSESSMENT — PAIN SCALES - GENERAL: PAINLEVEL_OUTOF10: 0-NO PAIN

## 2025-01-06 NOTE — PROGRESS NOTES
"Subjective     Patient ID: Danny Neff is a 40 y.o. male who presents for concerns for pneumonia    Thanksgiving: Fever, body aches, chillls. He felt better by 3-weeks later. Then he tested positve for covid 12/17/24. He started feeling symptoms the day before. Then after that he felt fever, body aches, chills. He tested negative 12/23/24.  He started to feel better. He was even able to use the treadmill. 1/1/25 he woke up with post-nasal drip and congestion.  He started to get fatigue-Feels weak. He was taking breaks in between weight sets. He is usually very physical active.   Review of Systems   HENT:  Positive for congestion. Negative for rhinorrhea.    Respiratory:  Negative for cough, chest tightness, shortness of breath and wheezing.    Cardiovascular:  Negative for chest pain.       /88 (BP Location: Left arm, Patient Position: Sitting)   Pulse 84   Temp 37.2 °C (98.9 °F)   Ht 1.803 m (5' 11\")   Wt 104 kg (229 lb 12.8 oz)   SpO2 95%   BMI 32.05 kg/m²      Objective   Physical Exam  Vitals and nursing note reviewed.   Constitutional:       General: He is not in acute distress.  HENT:      Right Ear: Ear canal and external ear normal.      Left Ear: Ear canal and external ear normal.      Nose: No congestion or rhinorrhea.      Mouth/Throat:      Mouth: Mucous membranes are moist.   Eyes:      Conjunctiva/sclera: Conjunctivae normal.   Cardiovascular:      Rate and Rhythm: Normal rate and regular rhythm.      Heart sounds: No murmur heard.     No friction rub. No gallop.   Pulmonary:      Effort: No respiratory distress.      Breath sounds: No wheezing, rhonchi or rales.   Neurological:      Mental Status: He is alert.           Labs:   Lab Results   Component Value Date    WBC 4.7 03/24/2024    HGB 14.9 03/24/2024    HCT 45.2 03/24/2024     03/24/2024    TSH 2.29 02/10/2023    INR 1.0 01/03/2022     Lab Results   Component Value Date     (L) 03/24/2024    K 4.6 03/24/2024    CL " "103 03/24/2024    BUN 13 03/24/2024    CREATININE 1.08 03/24/2024    GLUCOSE 99 03/24/2024    CALCIUM 9.0 03/24/2024    PROT 8.3 (H) 03/23/2024    BILITOT 1.1 03/23/2024    ALKPHOS 62 03/23/2024    AST 45 (H) 03/23/2024    ALT 44 03/23/2024     Lab Results   Component Value Date    CHOL 273 (H) 02/10/2023    CHOL 236 (H) 04/30/2021      Lab Results   Component Value Date    TRIG 422 (H) 02/10/2023    TRIG 260 (H) 04/30/2021      Lab Results   Component Value Date    HDL 33.1 (A) 02/10/2023    HDL 33.2 (A) 04/30/2021     No results found for: \"LDLCALC\"   Lab Results   Component Value Date    VLDL SEE COMMENT 02/10/2023    VLDL 52 (H) 04/30/2021    No components found for: \"CHOLHDLRATI0\"    Imaging/Testing: MR brain w and wo IV contrast  Narrative: Interpreted By:  Tomasa Aquino,   STUDY:  MR BRAIN W AND WO IV CONTRAST; 3/24/2024 11:01 am      INDICATION:  Signs/Symptoms:History of multifocal motor neuropathy, diplopia while  looking forward and down.      COMPARISON:  CT head from 03/23/2024      ACCESSION NUMBER(S):  [id number]      ORDERING CLINICIAN:  PIERRE BRYSON      TECHNIQUE:  Axial T2, FLAIR, DWI, gradient echo T2 and T1 weighted images of  brain were acquired. Post contrast T1 weighted images were acquired  after administration of  gadolinium based intravenous contrast.      FINDINGS:  CSF Spaces: The ventricles, sulci and basal cisterns are within  normal limits.      Parenchyma: There is no diffusion restriction abnormality to suggest  acute infarct.  There are minimal T2 and FLAIR hyperintensities  within bilateral subcortical white matter. There is a small focus of  T2 and FLAIR hyperintense signal involving the right anteromedial  temporal region at the gray-white junction. There is no mass effect  or midline shift. There is no focus of abnormal parenchymal  enhancement. There is no evidence of leptomeningeal enhancement.      Paranasal Sinuses and Mastoids: Visualized paranasal sinuses and  mastoid " air cells are unremarkable.      Impression: Minimal subcortical white matter hyperintensities are nonspecific.  Differential considerations include sequela of migraine headaches,  chronic hypertension, vasculitis, demyelination or other causes.  Small focus of signal abnormality within the anteromedial right  temporal lobe may reflect sequela of similar etiology. There is no  associated enhancement or diffusion restriction. Follow-up imaging  may be acquired as clinically warranted.      Signed by: Tomasa Aquino 3/24/2024 11:26 AM  Dictation workstation:   [id number]      Problem List Items Addressed This Visit       Multifocal motor neuropathy (Multi) - Primary    Current Assessment & Plan     -I reviewed outside neurology records and specialty labs.  Unfortunately, patient did not complete blood work that was ordered at last appointment.  -Continue IVIG treatments.  -Will monitor for medication side effects and potential infections.          Other Visit Diagnoses       Other fatigue        Relevant Orders    Mononucleosis screen    Atypical pneumonia        Relevant Medications    doxycycline (Vibramycin) 100 mg capsule        Fatigue, malaise, myalgias secondary to viral infection/post-COVID versus atypical pneumonia versus mononucleosis.  -Patient receives IVIG therapy for multifocal motor neuropathy.  -Will treat suspected atypical pneumonia with doxycycline.  Medication side effects were reviewed with patient.  -Will rule out mononucleosis.  If patient is positive for acute infection, patient was advised to avoid contact sports.    [unfilled]    Patient and I discussed diet/nutrition, lifestyle modifications, safety, medication indications and side effects, and health goals.    orders and follow up as documented in EMR, repeat labs ordered prior to next appointment, reviewed medications and side effects in detail     Follow-up as needed      I have reviewed OARRS report, consistent with prescribed  medication. I have considered risks of abuse, diversion, side effects and feel clinically benefit of medication outweighs risks at this time.

## 2025-01-06 NOTE — ASSESSMENT & PLAN NOTE
-I reviewed outside neurology records and specialty labs.  Unfortunately, patient did not complete blood work that was ordered at last appointment.  -Continue IVIG treatments.  -Will monitor for medication side effects and potential infections.

## 2025-01-07 LAB
25(OH)D3 SERPL-MCNC: 38 NG/ML (ref 30–100)
EST. AVERAGE GLUCOSE BLD GHB EST-MCNC: 85 MG/DL
HBA1C MFR BLD: 4.6 %

## 2025-01-08 LAB
ACHR BIND AB SER-SCNC: 0.1 NMOL/L (ref 0–0.4)
MUSK AB SER QL: NORMAL

## 2025-04-14 ENCOUNTER — TELEPHONE (OUTPATIENT)
Dept: PRIMARY CARE | Facility: CLINIC | Age: 40
End: 2025-04-14
Payer: COMMERCIAL

## 2025-04-14 DIAGNOSIS — Z30.09 VASECTOMY EVALUATION: Primary | ICD-10-CM

## 2025-04-14 NOTE — TELEPHONE ENCOUNTER
I put in the referral for Urology for vasectomy evaluation.    Alfonso Ghosh MD, [facility]  Physician  Department of Family Medicine of [facility] Primary [facility]

## 2025-04-22 ENCOUNTER — OFFICE VISIT (OUTPATIENT)
Dept: PRIMARY CARE | Facility: CLINIC | Age: 40
End: 2025-04-22
Payer: COMMERCIAL

## 2025-04-22 VITALS
SYSTOLIC BLOOD PRESSURE: 124 MMHG | TEMPERATURE: 98.1 F | DIASTOLIC BLOOD PRESSURE: 87 MMHG | OXYGEN SATURATION: 98 % | HEIGHT: 71 IN | BODY MASS INDEX: 31.53 KG/M2 | HEART RATE: 75 BPM | WEIGHT: 225.2 LBS

## 2025-04-22 DIAGNOSIS — R30.0 DYSURIA: Primary | ICD-10-CM

## 2025-04-22 DIAGNOSIS — G61.82 MULTIFOCAL MOTOR NEUROPATHY (MULTI): ICD-10-CM

## 2025-04-22 DIAGNOSIS — N30.00 ACUTE CYSTITIS WITHOUT HEMATURIA: ICD-10-CM

## 2025-04-22 LAB
POC APPEARANCE, URINE: CLEAR
POC BILIRUBIN, URINE: NEGATIVE
POC BLOOD, URINE: NEGATIVE
POC COLOR, URINE: YELLOW
POC GLUCOSE, URINE: NEGATIVE MG/DL
POC KETONES, URINE: NEGATIVE MG/DL
POC LEUKOCYTES, URINE: NEGATIVE
POC NITRITE,URINE: NEGATIVE
POC PH, URINE: 6.5 PH
POC PROTEIN, URINE: NEGATIVE MG/DL
POC SPECIFIC GRAVITY, URINE: 1.01
POC UROBILINOGEN, URINE: 0.2 EU/DL

## 2025-04-22 PROCEDURE — 81003 URINALYSIS AUTO W/O SCOPE: CPT | Performed by: STUDENT IN AN ORGANIZED HEALTH CARE EDUCATION/TRAINING PROGRAM

## 2025-04-22 PROCEDURE — 3008F BODY MASS INDEX DOCD: CPT | Performed by: STUDENT IN AN ORGANIZED HEALTH CARE EDUCATION/TRAINING PROGRAM

## 2025-04-22 PROCEDURE — 1036F TOBACCO NON-USER: CPT | Performed by: STUDENT IN AN ORGANIZED HEALTH CARE EDUCATION/TRAINING PROGRAM

## 2025-04-22 PROCEDURE — 99214 OFFICE O/P EST MOD 30 MIN: CPT | Performed by: STUDENT IN AN ORGANIZED HEALTH CARE EDUCATION/TRAINING PROGRAM

## 2025-04-22 RX ORDER — NITROFURANTOIN 25; 75 MG/1; MG/1
100 CAPSULE ORAL 2 TIMES DAILY
Qty: 14 CAPSULE | Refills: 0 | Status: SHIPPED | OUTPATIENT
Start: 2025-04-22 | End: 2025-04-29

## 2025-04-22 ASSESSMENT — ENCOUNTER SYMPTOMS
HEMATURIA: 0
SHORTNESS OF BREATH: 0
CHILLS: 0
ABDOMINAL PAIN: 0
DIZZINESS: 0
LIGHT-HEADEDNESS: 0
FEVER: 0
EYE PAIN: 0
RHINORRHEA: 0
UNEXPECTED WEIGHT CHANGE: 0

## 2025-04-22 ASSESSMENT — PATIENT HEALTH QUESTIONNAIRE - PHQ9
SUM OF ALL RESPONSES TO PHQ9 QUESTIONS 1 AND 2: 0
2. FEELING DOWN, DEPRESSED OR HOPELESS: NOT AT ALL
1. LITTLE INTEREST OR PLEASURE IN DOING THINGS: NOT AT ALL

## 2025-04-22 NOTE — ASSESSMENT & PLAN NOTE
-I reviewed outside neurology records and specialty labs.    -Continue IVIG treatments.  -Will monitor for medication side effects and potential infections.

## 2025-04-22 NOTE — PROGRESS NOTES
"Subjective     Patient ID: Danny Neff is a 40 y.o. male who presents today for acute/chronic issues.    Interval PCP history 4/22/2025:  Patient had increased urination frequency 7-8 times last night. He has not stopped urinating since. Perhaps every hours. He denies dysuria, hematuria, urinary discharge, urinary odor.  He does not describe weak urinary stream or sensation of incomplete bladder sensation.  He has never experienced anything like this before.  He has had normal bowel movements.    Tobacco/Alcohol/Drug Use:   Social History     Tobacco Use    Smoking status: Never     Passive exposure: Never    Smokeless tobacco: Never   Substance Use Topics    Alcohol use: Not Currently       Required Screenings/Immunizations:   Health Maintenance Due   Topic Date Due    HIV Screening  Never done    MMR Vaccines (1 of 1 - Standard series) Never done    Varicella Vaccines (1 of 2 - 13+ 2-dose series) Never done    Hepatitis B Vaccines (1 of 3 - 19+ 3-dose series) Never done    COVID-19 Vaccine (1 - 2024-25 season) Never done       Problems to be addressed today in addition to Preventative Services: As stated in orders.     Review of Systems   Constitutional:  Negative for chills, fever and unexpected weight change.   HENT:  Negative for rhinorrhea.    Eyes:  Negative for pain.   Respiratory:  Negative for shortness of breath.    Cardiovascular:  Negative for chest pain.   Gastrointestinal:  Negative for abdominal pain.   Genitourinary:  Negative for hematuria.   Skin:  Negative for rash.   Neurological:  Negative for dizziness, syncope and light-headedness.   Psychiatric/Behavioral:  Negative for behavioral problems and suicidal ideas.        /87 (BP Location: Left arm, Patient Position: Sitting, BP Cuff Size: Adult)   Pulse 75   Temp 36.7 °C (98.1 °F) (Temporal)   Ht 1.803 m (5' 11\")   Wt 102 kg (225 lb 3.2 oz)   SpO2 98%   BMI 31.41 kg/m²      Objective   Physical Exam  Vitals reviewed. "   Constitutional:       General: He is not in acute distress.  HENT:      Head: Normocephalic.      Right Ear: External ear normal.      Left Ear: External ear normal.      Nose: No rhinorrhea.      Mouth/Throat:      Mouth: Mucous membranes are moist.   Eyes:      Conjunctiva/sclera: Conjunctivae normal.   Cardiovascular:      Rate and Rhythm: Normal rate and regular rhythm.      Heart sounds: No murmur heard.     No friction rub. No gallop.   Pulmonary:      Effort: No respiratory distress.      Breath sounds: No wheezing, rhonchi or rales.   Abdominal:      General: Bowel sounds are normal. There is no distension.      Palpations: Abdomen is soft.      Tenderness: There is no abdominal tenderness.   Musculoskeletal:      Cervical back: Neck supple.      Right lower leg: No edema.      Left lower leg: No edema.   Skin:     General: Skin is warm and dry.      Capillary Refill: Capillary refill takes less than 2 seconds.   Neurological:      Mental Status: He is alert.      Gait: Gait normal.           Labs:   Lab Results   Component Value Date    WBC 5.8 01/06/2025    HGB 13.7 01/06/2025    HCT 41.7 01/06/2025     01/06/2025    TSH 3.52 01/06/2025    INR 1.0 01/03/2022     Lab Results   Component Value Date     01/06/2025    K 4.5 01/06/2025     01/06/2025    BUN 13 01/06/2025    CREATININE 0.94 01/06/2025    GLUCOSE 85 01/06/2025    CALCIUM 9.5 01/06/2025    PROT 8.2 01/06/2025    BILITOT 1.0 01/06/2025    ALKPHOS 105 01/06/2025    AST 45 (H) 01/06/2025    ALT 58 (H) 01/06/2025     Lab Results   Component Value Date    CHOL 272 (H) 01/06/2025    CHOL 273 (H) 02/10/2023    CHOL 236 (H) 04/30/2021      Lab Results   Component Value Date    TRIG 335 (H) 01/06/2025    TRIG 422 (H) 02/10/2023    TRIG 260 (H) 04/30/2021      Lab Results   Component Value Date    HDL 42.0 01/06/2025    HDL 33.1 (A) 02/10/2023    HDL 33.2 (A) 04/30/2021     Lab Results   Component Value Date    LDLCALC 163 (H)  "01/06/2025      Lab Results   Component Value Date    VLDL 67 (H) 01/06/2025    VLDL SEE COMMENT 02/10/2023    VLDL 52 (H) 04/30/2021    No components found for: \"[id number]\"    Imaging/Testing: MR brain w and wo IV contrast  Narrative: Interpreted By:  Tomasa Aquino,   STUDY:  MR BRAIN W AND WO IV CONTRAST; 3/24/2024 11:01 am      INDICATION:  Signs/Symptoms:History of multifocal motor neuropathy, diplopia while  looking forward and down.      COMPARISON:  CT head from 03/23/2024      ACCESSION NUMBER(S):  [id number]      ORDERING CLINICIAN:  PIERRE BRYSON      TECHNIQUE:  Axial T2, FLAIR, DWI, gradient echo T2 and T1 weighted images of  brain were acquired. Post contrast T1 weighted images were acquired  after administration of  gadolinium based intravenous contrast.      FINDINGS:  CSF Spaces: The ventricles, sulci and basal cisterns are within  normal limits.      Parenchyma: There is no diffusion restriction abnormality to suggest  acute infarct.  There are minimal T2 and FLAIR hyperintensities  within bilateral subcortical white matter. There is a small focus of  T2 and FLAIR hyperintense signal involving the right anteromedial  temporal region at the gray-white junction. There is no mass effect  or midline shift. There is no focus of abnormal parenchymal  enhancement. There is no evidence of leptomeningeal enhancement.      Paranasal Sinuses and Mastoids: Visualized paranasal sinuses and  mastoid air cells are unremarkable.      Impression: Minimal subcortical white matter hyperintensities are nonspecific.  Differential considerations include sequela of migraine headaches,  chronic hypertension, vasculitis, demyelination or other causes.  Small focus of signal abnormality within the anteromedial right  temporal lobe may reflect sequela of similar etiology. There is no  associated enhancement or diffusion restriction. Follow-up imaging  may be acquired as clinically warranted.      Signed by: Tomasa Aquino " 3/24/2024 11:26 AM  Dictation workstation:   [id number]    Assessment/Plan   Problem List Items Addressed This Visit          Medium    Multifocal motor neuropathy (Multi)    -I reviewed outside neurology records and specialty labs.    -Continue IVIG treatments.  -Will monitor for medication side effects and potential infections.          Other Visit Diagnoses         Dysuria    -  Primary    Relevant Orders    POCT UA Automated manually resulted (Completed)    Urine Culture      Acute cystitis without hematuria        Relevant Medications    nitrofurantoin, macrocrystal-monohydrate, (Macrobid) 100 mg capsule        - I reviewed point-of-care urinalysis results.  Possible patient had false negative based on his water consumption and frequent urination.  - Will treat complicated UTI in a male with 10 days of Macrobid.  - Will follow-up on urine culture results.       As part of today's Preventative Visit, an age and gender-appropriate history and physical was performed, as documented below. Counseling and anticipatory guidance were performed, and risk factor reduction interventions (including [place] Preventative Services Task Force recommended screening tests) were utilized/ordered as outlined in the above Assessment and Plan. All patient medications were reviewed, and refilled if necessary.    Patient and I discussed diet/nutrition, lifestyle modifications, safety, medication indications and side effects, and health goals.    current treatment plan is effective, no change in therapy, orders and follow up as documented in EMR, lab results reviewed with patient, repeat labs ordered prior to next appointment, reviewed compliance with lifestyle measures, reviewed diet, exercise and weight control, reviewed medications and side effects in detail           I have reviewed OARRS report, consistent with prescribed medication. I have considered risks of abuse, diversion, side effects and feel clinically benefit of  medication outweighs risks at this time.

## 2025-04-24 LAB — BACTERIA UR CULT: NORMAL

## 2025-05-01 ENCOUNTER — OFFICE VISIT (OUTPATIENT)
Dept: UROLOGY | Facility: HOSPITAL | Age: 40
End: 2025-05-01
Payer: COMMERCIAL

## 2025-05-01 DIAGNOSIS — N30.00 ACUTE CYSTITIS WITHOUT HEMATURIA: Primary | ICD-10-CM

## 2025-05-01 DIAGNOSIS — Z30.09 VASECTOMY EVALUATION: ICD-10-CM

## 2025-05-01 PROCEDURE — 99204 OFFICE O/P NEW MOD 45 MIN: CPT | Performed by: UROLOGY

## 2025-05-01 PROCEDURE — 1036F TOBACCO NON-USER: CPT | Performed by: UROLOGY

## 2025-05-01 PROCEDURE — G2211 COMPLEX E/M VISIT ADD ON: HCPCS | Performed by: UROLOGY

## 2025-05-01 PROCEDURE — 99214 OFFICE O/P EST MOD 30 MIN: CPT | Performed by: UROLOGY

## 2025-05-01 RX ORDER — SULFAMETHOXAZOLE AND TRIMETHOPRIM 800; 160 MG/1; MG/1
1 TABLET ORAL 2 TIMES DAILY
Qty: 14 TABLET | Refills: 0 | Status: SHIPPED | OUTPATIENT
Start: 2025-05-01 | End: 2025-05-08

## 2025-05-01 NOTE — PROGRESS NOTES
HPI    40 y.o. male being seen with the following problem list:    Problem list:  Vas consult  ?UTI    05/01/25 - 40 y.o. year old male being seen for vasectomy consult.  , 2 children, age 6, 3. Currently using nothing for protection.  He and his wife have not decided about vasectomy versus have another child.  No bothersome LUTS. No UTIs, hematuria, stones. No history of scrotal trauma or surgery. Erections work well. ? Uti last month, presented with weakened stream, urgency, frequency.  Had a course of Macrobid was better for a few days and then symptoms came back.  Urine culture was negative.  UA showed no blood and no infection.         Current Medications:  Current Medications[1]     Active Problems:  Danny Neff is a 40 y.o. male with the following Problems and Medications.  Problem List[2]  Current Medications[3]    PMH:  Medical History[4]    PSH:  Surgical History[5]    FMH:  Family History[6]    SHx:  Social History[7]    Allergies:  RX Allergies[8]    Physical Exam:  Vasa easily palpable    Assessment/Plan  We discussed that vasectomy is intended to be a permanent form of contraception, and that while options for reversal exist they are often costly, time-intensive, and not guaranteed to produce ejaculate with motile sperm.      We discussed that vasectomy does not produce immediate sterility, and following vasectomy another form of contraception is required until vas occlusion is confirmed by post-vasectomy semen analysis. We discussed the post-procedural pathway, including the recommendation to abstain from ejaculation for 1 week after vasectomy as well as post-operative activity restrictions and care. We discussed that 8-16 weeks after vasectomy is the appropriate time for the first PVSA.      We discussed that even after vas occlusion is confirmed, vasectomy is not 100% reliable in preventing regrowth due to the risk of recannulation. The risk of pregnancy after vasectomy is approximately  1/2,000 in men who have documented post-vasectomy azoospermia or rare non-motile sperm.      We discussed the less than 1% risk of repeat vasectomy to achieve sterility. We discussed the rate of procedural complications including symptomatic hematoma and infection of 1-2%. We discussed the risk of chronic scrotal pain in about 1-2% of men. We discussed other options for permanent and non-permanent contraception including barrier methods, IUD, oral contraception, and tubal ligation.     They will think about it and let me know.    We discussed his urinary symptoms.  Previous culture was negative.  He does not over hydrate and so maybe the culture was diluted.  Will send another culture and treat empirically with Bactrim for a week.  Will see him in 2 weeks for follow-up and symptom check.  Will probably need additional workup once the current episode is resolved.             [1]   Current Outpatient Medications   Medication Sig Dispense Refill    cholecalciferol (Vitamin D3) 50 mcg (2,000 unit) capsule Take 2 capsules (4,000 Units) by mouth once daily.      immune globulin,gamma,IgG,ifas 10 % solution Infuse into a venous catheter.      sulfamethoxazole-trimethoprim (Bactrim DS) 800-160 mg tablet Take 1 tablet by mouth 2 times a day for 7 days. 14 tablet 0     No current facility-administered medications for this visit.   [2]   Patient Active Problem List  Diagnosis    Multifocal motor neuropathy (Multi)   [3]   Current Outpatient Medications   Medication Sig Dispense Refill    cholecalciferol (Vitamin D3) 50 mcg (2,000 unit) capsule Take 2 capsules (4,000 Units) by mouth once daily.      immune globulin,gamma,IgG,ifas 10 % solution Infuse into a venous catheter.      sulfamethoxazole-trimethoprim (Bactrim DS) 800-160 mg tablet Take 1 tablet by mouth 2 times a day for 7 days. 14 tablet 0     No current facility-administered medications for this visit.   [4]   Past Medical History:  Diagnosis Date    Abnormal levels  of other serum enzymes 06/17/2022    Elevated liver enzymes    Chills (without fever) 01/19/2017    Chills    COVID-19     COVID-19    Diplopia 03/23/2024    Lactose intolerance, unspecified 04/28/2014    Lactose intolerance    Pain in right shoulder 04/26/2016    Pain in joint of right shoulder    Pain, unspecified 01/19/2017    Body aches    Personal history of other diseases of the respiratory system 01/19/2017    History of sore throat    Personal history of other diseases of the respiratory system 06/26/2014    History of upper respiratory infection    Personal history of other endocrine, nutritional and metabolic disease 05/06/2021    History of hyperlipidemia    Personal history of other specified conditions 12/10/2014    History of chronic fatigue    Radiculopathy, cervical region 11/25/2018    Cervical radiculopathy at C7    Radiculopathy, cervical region 11/29/2018    Cervical radiculopathy at C8   [5]   Past Surgical History:  Procedure Laterality Date    HERNIA REPAIR  10/31/2013    Hernia Repair    OTHER SURGICAL HISTORY  12/16/2021    Shoulder arthroscopy    OTHER SURGICAL HISTORY  12/16/2021    Biceps tenodesis procedure    OTHER SURGICAL HISTORY  12/16/2021    Colonoscopy    OTHER SURGICAL HISTORY  12/16/2021    Oral surgery    OTHER SURGICAL HISTORY  12/16/2021    Endoscopy   [6] No family history on file.  [7]   Social History  Tobacco Use    Smoking status: Never     Passive exposure: Never    Smokeless tobacco: Never   Vaping Use    Vaping status: Never Used   Substance Use Topics    Alcohol use: Not Currently    Drug use: Never   [8] No Known Allergies

## 2025-05-03 LAB — BACTERIA UR CULT: NORMAL

## 2025-05-15 ENCOUNTER — EVALUATION (OUTPATIENT)
Dept: PHYSICAL THERAPY | Facility: CLINIC | Age: 40
End: 2025-05-15
Payer: COMMERCIAL

## 2025-05-15 ENCOUNTER — TELEMEDICINE (OUTPATIENT)
Dept: UROLOGY | Facility: HOSPITAL | Age: 40
End: 2025-05-15
Payer: COMMERCIAL

## 2025-05-15 DIAGNOSIS — N30.00 ACUTE CYSTITIS WITHOUT HEMATURIA: Primary | ICD-10-CM

## 2025-05-15 DIAGNOSIS — N41.8 OTHER PROSTATIC INFLAMMATORY DISEASES: ICD-10-CM

## 2025-05-15 DIAGNOSIS — M54.40 LOW BACK PAIN WITH SCIATICA, SCIATICA LATERALITY UNSPECIFIED, UNSPECIFIED BACK PAIN LATERALITY, UNSPECIFIED CHRONICITY: Primary | ICD-10-CM

## 2025-05-15 DIAGNOSIS — R29.898 LOWER EXTREMITY WEAKNESS: ICD-10-CM

## 2025-05-15 PROBLEM — N41.9 PROSTATITIS: Status: ACTIVE | Noted: 2025-05-15

## 2025-05-15 PROCEDURE — 97140 MANUAL THERAPY 1/> REGIONS: CPT | Mod: GP | Performed by: PHYSICAL THERAPIST

## 2025-05-15 PROCEDURE — 97110 THERAPEUTIC EXERCISES: CPT | Mod: GP | Performed by: PHYSICAL THERAPIST

## 2025-05-15 PROCEDURE — 99213 OFFICE O/P EST LOW 20 MIN: CPT | Performed by: UROLOGY

## 2025-05-15 PROCEDURE — 97161 PT EVAL LOW COMPLEX 20 MIN: CPT | Mod: GP | Performed by: PHYSICAL THERAPIST

## 2025-05-15 ASSESSMENT — ENCOUNTER SYMPTOMS
LOSS OF SENSATION IN FEET: 0
OCCASIONAL FEELINGS OF UNSTEADINESS: 0
DEPRESSION: 0

## 2025-05-15 NOTE — PROGRESS NOTES
Physical Therapy Evaluation and Treatment    Patient Name: Danny Neff  MRN: [id number]  Encounter Date: 5/15/2025  Visit #1  Payor: KORTNEY / Plan: KORTNEY TRADITIONAL / Product Type: *No Product type* /     NO AUTH, 35.00 CO PAY, 100% COVERAGE, 50V PT/OT/ST, 7500 OOP-NOT MET, KORTNEY     Time Calculation  Start Time: 1302  Stop Time: 1349  Time Calculation (min): 47 min  Reason for Referral: Low back pain with radiculopathy  Referred By: Alfonso Ghosh  Preferred Learning Style: kinesthetic, visual  PT Evaluation Time Entry  PT Evaluation (Low) Time Entry: 24  PT Therapeutic Procedures Time Entry  Manual Therapy Time Entry: 10  Therapeutic Exercise Time Entry: 13          Current Problem  Problem List Items Addressed This Visit    None  Visit Diagnoses         Codes      Low back pain with sciatica, sciatica laterality unspecified, unspecified back pain laterality, unspecified chronicity    -  Primary M54.40    Relevant Orders    Follow Up In Physical Therapy      Lower extremity weakness     R29.898            Assessment:  Patient presents to the clinic with low back pain and occasional radicular pain to bilateral calves.  Patient demonstrates a neutral to slight flexion bias to decrease his radicular pain. Patient demonstrates decreased hip ROM and strength, affecting his low back stability with prolonged walking, standing and sitting.  Patient will benefit from skilled PT to increase his LE strength, improve his hip flexibility and decrease his pain to return to his PLOF.       Plan:   OP PT Plan  Treatment/Interventions: Cryotherapy, Education/ Instruction, Hot pack, Manual therapy, Mechanical traction, Neuromuscular re-education, Self care/ home management, Therapeutic activities, Therapeutic exercises  PT Plan: Skilled PT  PT Frequency: 2 times per week  Duration: 12 visits  Onset Date: 04/28/25  Rehab Potential: Good  Plan of Care Agreement: Patient     General  Reason for Referral: Low back pain with  radiculopathy  Referred By: Alfonso Ghosh  Preferred Learning Style: kinesthetic, visual  Referred by: Alfonso Ghosh    Precautions: none  Precautions  STEADI Fall Risk Score (The score of 4 or more indicates an increased risk of falling): 0  Precautions Comment: none       HPI:  Patient went for a run on Thursday and the following Saturday and Sunday he could not stand up.  Patient had pain down both legs to the calves.  Patient was lying on either side which helped.  Patient feels it is slowly getting better.         Prior or current Tx:  Patient has been resting and taking Advil      PMH: multifocal motor neuropathy     Pain: Patient notes that the sciatic pain is pretty much gone.  Patient notes that the low back continues to hurt.  Patient notes that standing and walking for a long time the back hurts.  Patient notes that stretch forward takes the pressure off and the pain improves.  Patient notes that sitting can hurt if he is there for too long.      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        Objective:  Outcome Measures: CHAIM=20%    Palpation: Tenderness at lumbar paraspinals    Gait: slow gait with wide TEJINDER   Functional mobility: Patient is able to roll over in bed; has pain with getting up from a chair    Lumbar AROM: Repeated motion in standing   Degrees/Motion Symptom Change   Flexion 20% limited Tightness   Extension 80% limited  tightness   RSB WNL tightness   LSB WNL tightness   Rrot WNL    Lrot WNL        Lower Extremity Strength:  Date: eval RIGHT LEFT   Hip Flexion 4/5 4/5    Hip Extension 4/5 4/5   Hip Abduction 4/5 4/5   Hip Adduction 5/5 5/5   Knee Extension 4/5 5/5   Knee Flexion 5/5 5/5   Ankle DF 5/5 5/5   Ankle PF 5/5 5/5   Ankle INV 5/5 5/5   Ankle EV 5/5 5/5     Lower Extremity ROM:  Date: eval RIGHT LEFT   Hip Flexion     Hip Extension     Hip IR 15 deg 15 deg   Hip ER  "45 deg with pain in the back 45 deg with some discomfort   Knee Extension     Knee Flexion     Ankle DF     Ankle PF     Ankle INV     Ankle EV       Sensation: intact light touch sensation     Special Tests:  -slump test bilaterally     PA of L1 - normal  PA of L2 - normal  PA of L3 - hypomobile   PA of L4 - hypomobile and painful   PA of L5 - hypomobile  PA of S1 - normal     Treatment:  Therapeutic Exercises  PPT 2 x 10   STKC 3 x 30\"  Lumbar Flexion with physioball 5 x 5 sec      Manual Therapy  Lumbar Traction   STM of L glute minimus       HEP:    Access Code: [email or web address]  URL: [email or web address]  Date: 05/15/2025  Prepared by: Lindsey Devine    Exercises  - Supine Posterior Pelvic Tilt  - 1 x daily - 7 x weekly - 2 sets - 10 reps  - Hooklying Single Knee to Chest Stretch  - 1 x daily - 7 x weekly - 3 sets - 30 hold    Goals:   Active       PT Problem       HEP       Start:  05/15/25    Expected End:  06/05/25       Patient will be independent with HEP and self management of condition.         Strength       Start:  05/15/25    Expected End:  07/10/25       Patient will increase hip strength to 5/5 on MMT with 0/10 pain to improve low back stability with standing and walking          ROM        Start:  05/15/25    Expected End:  06/05/25       Patient will increase lumbar ROM to WNL with 0/10 pain to functionally roll over in bed and  objects off the floor.           Pain       Start:  05/15/25    Expected End:  06/05/25       Patient will eliminate all radicular pain to bilateral lower extremities with his daily and recreational activities.          Outcomes       Start:  05/15/25    Expected End:  07/10/25       Patient will improve CHAIM score to <10% to demonstrate an improvement in his pain with daily activities.          Function       Start:  05/15/25    Expected End:  07/10/25       Patient will complete 10 reps of sit to stands with proper body mechanics and 0/10 pain to " functionally transition in and out of his desk chair at work.

## 2025-05-15 NOTE — PROGRESS NOTES
HPI    Virtual or Telephone Consent    While technically available, the patient was unable or unwilling to consent to connect via audio/video telehealth technology; therefore, I performed this visit using a real-time audio only connection between Danny Neff & Jamison Oliva MD.  Verbal consent was requested and obtained from Danny Neff on this date, 05/15/25 for a telehealth visit and the patient's location was confirmed at the time of the visit.    40 y.o. male being seen with the following problem list:    Problem list:  Vas consult     05/01/25 - 40 y.o. year old male being seen for vasectomy consult.  , 2 children, age 6, 3. Currently using nothing for protection.  He and his wife have not decided about vasectomy versus have another child.  No bothersome LUTS. No UTIs, hematuria, stones. No history of scrotal trauma or surgery. Erections work well. ? Uti last month, presented with weakened stream, urgency, frequency.  Had a course of Macrobid was better for a few days and then symptoms came back.  Urine culture was negative.  UA showed no blood and no infection.    Urine Culture 05/01/25 negative     05/15/25 - THV. The patient improved after taking Bactrim. He has increased fluids with naturally increased frequency. Nocturia resolved. Denies pain, pressure, discomfort. Urine is clear.     Current Medications:  Current Medications[1]     Active Problems:  Danny Neff is a 40 y.o. male with the following Problems and Medications.  Problem List[2]  Current Medications[3]    PMH:  Medical History[4]    PSH:  Surgical History[5]    FMH:  Family History[6]    SHx:  Social History[7]    Allergies:  RX Allergies[8]    Assessment/Plan  Symptoms most c/w with non-infectious prostatitis. Discussed that this may or may not recur, can treat with anti-inflammatories prn. The patient will call if symptoms recur. The patient is not ready to commit to vasectomy procedure. Follow-up prn.         Scribe  Attestation  By signing my name below, I, Buffy Amanda Landeros   attest that this documentation has been prepared under the direction and in the presence of Jamison Oliva MD.         [1]   Current Outpatient Medications   Medication Sig Dispense Refill    cholecalciferol (Vitamin D3) 50 mcg (2,000 unit) capsule Take 2 capsules (4,000 Units) by mouth once daily.      immune globulin,gamma,IgG,ifas 10 % solution Infuse into a venous catheter.       No current facility-administered medications for this visit.   [2]   Patient Active Problem List  Diagnosis    Multifocal motor neuropathy (Multi)   [3]   Current Outpatient Medications   Medication Sig Dispense Refill    cholecalciferol (Vitamin D3) 50 mcg (2,000 unit) capsule Take 2 capsules (4,000 Units) by mouth once daily.      immune globulin,gamma,IgG,ifas 10 % solution Infuse into a venous catheter.       No current facility-administered medications for this visit.   [4]   Past Medical History:  Diagnosis Date    Abnormal levels of other serum enzymes 06/17/2022    Elevated liver enzymes    Chills (without fever) 01/19/2017    Chills    COVID-19     COVID-19    Diplopia 03/23/2024    Lactose intolerance, unspecified 04/28/2014    Lactose intolerance    Pain in right shoulder 04/26/2016    Pain in joint of right shoulder    Pain, unspecified 01/19/2017    Body aches    Personal history of other diseases of the respiratory system 01/19/2017    History of sore throat    Personal history of other diseases of the respiratory system 06/26/2014    History of upper respiratory infection    Personal history of other endocrine, nutritional and metabolic disease 05/06/2021    History of hyperlipidemia    Personal history of other specified conditions 12/10/2014    History of chronic fatigue    Radiculopathy, cervical region 11/25/2018    Cervical radiculopathy at C7    Radiculopathy, cervical region 11/29/2018    Cervical radiculopathy at C8   [5]   Past Surgical  History:  Procedure Laterality Date    HERNIA REPAIR  10/31/2013    Hernia Repair    OTHER SURGICAL HISTORY  12/16/2021    Shoulder arthroscopy    OTHER SURGICAL HISTORY  12/16/2021    Biceps tenodesis procedure    OTHER SURGICAL HISTORY  12/16/2021    Colonoscopy    OTHER SURGICAL HISTORY  12/16/2021    Oral surgery    OTHER SURGICAL HISTORY  12/16/2021    Endoscopy   [6] No family history on file.  [7]   Social History  Tobacco Use    Smoking status: Never     Passive exposure: Never    Smokeless tobacco: Never   Vaping Use    Vaping status: Never Used   Substance Use Topics    Alcohol use: Not Currently    Drug use: Never   [8] No Known Allergies

## 2025-05-20 ENCOUNTER — TREATMENT (OUTPATIENT)
Dept: PHYSICAL THERAPY | Facility: CLINIC | Age: 40
End: 2025-05-20
Payer: COMMERCIAL

## 2025-05-20 DIAGNOSIS — M54.40 LOW BACK PAIN WITH SCIATICA, SCIATICA LATERALITY UNSPECIFIED, UNSPECIFIED BACK PAIN LATERALITY, UNSPECIFIED CHRONICITY: Primary | ICD-10-CM

## 2025-05-20 PROCEDURE — 97110 THERAPEUTIC EXERCISES: CPT | Mod: GP | Performed by: PHYSICAL THERAPIST

## 2025-05-20 NOTE — PROGRESS NOTES
Physical Therapy    Physical Therapy Treatment    Patient Name: Danny Neff  MRN: [id number]  Encounter Date: 5/20/2025  Visit #2  Payor: KORTNEY / Plan: KORTNEY TRADITIONAL / Product Type: *No Product type* /     NO AUTH, 35.00 CO PAY, 100% COVERAGE, 50V PT/OT/ST, 7500 OOP-NOT MET, KORTNEY     Time Calculation  Start Time: 1745  Stop Time: 1831  Time Calculation (min): 46 min        PT Therapeutic Procedures Time Entry  Manual Therapy Time Entry: 8  Therapeutic Exercise Time Entry: 38          Current Problem  Problem List Items Addressed This Visit    None  Visit Diagnoses         Codes      Low back pain with sciatica, sciatica laterality unspecified, unspecified back pain laterality, unspecified chronicity    -  Primary M54.40              Assessment:  Patient presents to the clinic with low back pain and occasional radicular pain to bilateral calves.  Patient demonstrates a neutral to slight flexion bias to decrease his radicular pain.  Patient required cues with TA exercises to maintain a neutral spine and prevent low back involvement.  Patient was unable to complete TA marching without activating low back musculature, so it was stopped.  Patient was able to complete TA SLR without the low back.  Patient required cues with mini squats to prevent extension of the low back.  Patient reported less radicular pain when he was able to perform correctly.  Patient will benefit from skilled PT to increase his LE strength, improve his hip flexibility and decrease his pain to return to his PLOF.       Plan: Continue with POC           Subjective  Patient notes that his pain comes and goes. Today, the pain is not too bad, rating it at 2/10 without radicular pain.  Patient notes that he tried to play baseball over the weekend and his pain flared up.      General     Referred by: Alfonso Ghosh    Precautions: none              Objective:  Not assessed this date    Treatment:  Therapeutic Exercises  PPT 2 x 10   Hip  "Isometric Adduction 2 x 10   Hip Isometric Abduction 2 x 10   STKC 3 x 30  Piriformis Stretch 3 x 30\"  SLR with TA 2 x 10   Clamshell 2 x 10   TA with marching - stopped due to pain   Mini Squat 10x      Manual Therapy  Lumbar Traction       HEP:  Access Code: [email or web address]  URL: [email or web address]  Date: 05/20/2025  Prepared by: Lindsey Devine    Exercises  - Supine Posterior Pelvic Tilt  - 1 x daily - 7 x weekly - 3 sets - 10 reps  - Hooklying Single Knee to Chest Stretch  - 1 x daily - 7 x weekly - 3 sets - 1 reps - 30 hold  - Supine Piriformis Stretch with Foot on Ground  - 1 x daily - 7 x weekly - 3 sets - 30 hold  - Clamshell  - 1 x daily - 7 x weekly - 2 sets - 10 reps  - Supine Active Straight Leg Raise  - 1 x daily - 7 x weekly - 2 sets - 10 reps    Goals:   Active       PT Problem       HEP       Start:  05/15/25    Expected End:  06/05/25       Patient will be independent with HEP and self management of condition.         Strength       Start:  05/15/25    Expected End:  07/10/25       Patient will increase hip strength to 5/5 on MMT with 0/10 pain to improve low back stability with standing and walking          ROM        Start:  05/15/25    Expected End:  06/05/25       Patient will increase lumbar ROM to WNL with 0/10 pain to functionally roll over in bed and  objects off the floor.           Pain       Start:  05/15/25    Expected End:  06/05/25       Patient will eliminate all radicular pain to bilateral lower extremities with his daily and recreational activities.          Outcomes       Start:  05/15/25    Expected End:  07/10/25       Patient will improve CHAIM score to <10% to demonstrate an improvement in his pain with daily activities.          Function       Start:  05/15/25    Expected End:  07/10/25       Patient will complete 10 reps of sit to stands with proper body mechanics and 0/10 pain to functionally transition in and out of his desk chair at work.        "

## 2025-05-23 ENCOUNTER — TREATMENT (OUTPATIENT)
Dept: PHYSICAL THERAPY | Facility: CLINIC | Age: 40
End: 2025-05-23
Payer: COMMERCIAL

## 2025-05-23 DIAGNOSIS — M54.40 LOW BACK PAIN WITH SCIATICA, SCIATICA LATERALITY UNSPECIFIED, UNSPECIFIED BACK PAIN LATERALITY, UNSPECIFIED CHRONICITY: ICD-10-CM

## 2025-05-23 PROCEDURE — 97140 MANUAL THERAPY 1/> REGIONS: CPT | Mod: GP | Performed by: PHYSICAL THERAPIST

## 2025-05-23 PROCEDURE — 97110 THERAPEUTIC EXERCISES: CPT | Mod: GP | Performed by: PHYSICAL THERAPIST

## 2025-05-23 NOTE — PROGRESS NOTES
Physical Therapy    Physical Therapy Treatment    Patient Name: Danny Neff  MRN: [id number]  Encounter Date: 5/23/2025  Visit #3  Payor: KORTNEY / Plan: KORTNEY TRADITIONAL / Product Type: *No Product type* /     NO AUTH, 35.00 CO PAY, 100% COVERAGE, 50V PT/OT/ST, 7500 OOP-NOT MET, KORTNEY     Time Calculation  Start Time: 1433  Stop Time: 1511  Time Calculation (min): 38 min        PT Therapeutic Procedures Time Entry  Manual Therapy Time Entry: 8  Therapeutic Exercise Time Entry: 30          Current Problem  Problem List Items Addressed This Visit    None  Visit Diagnoses         Codes      Low back pain with sciatica, sciatica laterality unspecified, unspecified back pain laterality, unspecified chronicity     M54.40              Assessment:  Patient continues to demonstrate a flexion bias, so table exercises were continued.  Patient wasn't able to progress this date due to his high pain levels.  Patient reported a reduction in pain to 1-2/10 following session.   Patient will benefit from skilled PT to increase his LE strength, improve his hip flexibility and decrease his pain to return to his PLOF.       Plan:   OP PT Plan  Treatment/Interventions: Cryotherapy, Education/ Instruction, Hot pack, Manual therapy, Mechanical traction, Neuromuscular re-education, Self care/ home management, Therapeutic activities, Therapeutic exercises  PT Plan: Skilled PT  PT Frequency: 2 times per week  Duration: 12 visits  Onset Date: 04/28/25  Rehab Potential: Good  Plan of Care Agreement: Patient           Subjective  Patient note that he was getting better.  Patient notes that her lunges toward the dog this morning and hurt is back.  Patient has been sore all day in the back. He did do his exercises 2x and it has helped with the pain.  Patient denies pain in the legs.  Patient rates pain at 5/10 in the back.      General     Referred by: Alfonso Ghosh    Precautions: none          Objective:  Not assessed this  "date    Treatment:  Therapeutic Exercises x 30 min  PPT 2 x 10   STKC 3 x 30  DKTC with physioball 2 x 10   Piriformis Stretch 3 x 30\"  SLR with TA 2 x 10   Hamstring Stretching in supine 3 x 30\"   Supine Hip Abduction with red t-band 2 x 10   Hip Isometric Adduction 2 x 10     Manual Therapy x 8 min  Lumbar Traction    Not Completed this Date   Hip Isometric Abduction 2 x 10   Clamshell 2 x 10   TA with marching - stopped due to pain   Mini Squat 10x    HEP:  Access Code: [email or web address]  URL: [email or web address]  Date: 05/20/2025  Prepared by: Lindsey Devine    Exercises  - Supine Posterior Pelvic Tilt  - 1 x daily - 7 x weekly - 3 sets - 10 reps  - Hooklying Single Knee to Chest Stretch  - 1 x daily - 7 x weekly - 3 sets - 1 reps - 30 hold  - Supine Piriformis Stretch with Foot on Ground  - 1 x daily - 7 x weekly - 3 sets - 30 hold  - Clamshell  - 1 x daily - 7 x weekly - 2 sets - 10 reps  - Supine Active Straight Leg Raise  - 1 x daily - 7 x weekly - 2 sets - 10 reps    Goals:   Active       PT Problem       HEP       Start:  05/15/25    Expected End:  06/05/25       Patient will be independent with HEP and self management of condition.         Strength       Start:  05/15/25    Expected End:  07/10/25       Patient will increase hip strength to 5/5 on MMT with 0/10 pain to improve low back stability with standing and walking          ROM        Start:  05/15/25    Expected End:  06/05/25       Patient will increase lumbar ROM to WNL with 0/10 pain to functionally roll over in bed and  objects off the floor.           Pain       Start:  05/15/25    Expected End:  06/05/25       Patient will eliminate all radicular pain to bilateral lower extremities with his daily and recreational activities.          Outcomes       Start:  05/15/25    Expected End:  07/10/25       Patient will improve CHAIM score to <10% to demonstrate an improvement in his pain with daily activities.          Function  "      Start:  05/15/25    Expected End:  07/10/25       Patient will complete 10 reps of sit to stands with proper body mechanics and 0/10 pain to functionally transition in and out of his desk chair at work.

## 2025-05-27 ENCOUNTER — TREATMENT (OUTPATIENT)
Dept: PHYSICAL THERAPY | Facility: CLINIC | Age: 40
End: 2025-05-27
Payer: COMMERCIAL

## 2025-05-27 DIAGNOSIS — M54.40 LOW BACK PAIN WITH SCIATICA, SCIATICA LATERALITY UNSPECIFIED, UNSPECIFIED BACK PAIN LATERALITY, UNSPECIFIED CHRONICITY: ICD-10-CM

## 2025-05-27 PROCEDURE — 97140 MANUAL THERAPY 1/> REGIONS: CPT | Mod: GP | Performed by: PHYSICAL THERAPIST

## 2025-05-27 PROCEDURE — 97110 THERAPEUTIC EXERCISES: CPT | Mod: GP | Performed by: PHYSICAL THERAPIST

## 2025-05-27 PROCEDURE — 97530 THERAPEUTIC ACTIVITIES: CPT | Mod: GP | Performed by: PHYSICAL THERAPIST

## 2025-05-27 NOTE — PROGRESS NOTES
Physical Therapy    Physical Therapy Treatment    Patient Name: Danny Neff  MRN: [id number]  Encounter Date: 5/27/2025  Visit #4  Payor: KORTNEY / Plan: KORTNEY TRADITIONAL / Product Type: *No Product type* /     NO AUTH, 35.00 CO PAY, 100% COVERAGE, 50V PT/OT/ST, 7500 OOP-NOT MET, KORTNEY     Time Calculation  Start Time: 1728  Stop Time: 1823  Time Calculation (min): 55 min        PT Therapeutic Procedures Time Entry  Manual Therapy Time Entry: 10  Therapeutic Exercise Time Entry: 37  Therapeutic Activity Time Entry: 8          Current Problem  Problem List Items Addressed This Visit    None  Visit Diagnoses         Codes      Low back pain with sciatica, sciatica laterality unspecified, unspecified back pain laterality, unspecified chronicity     M54.40              Assessment:  Patient continues to demonstrate a flexion bias, so table exercises were continued.  Patient progressed to some standing exercises this date, before needing to sit and stretch due to the pain in his back.  Patient continues to be challenged with maintaining a TA contraction while completing LE movements.  MTT were completed to improve low back mobility and decrease pain.  Patient will benefit from skilled PT to increase his LE strength, improve his hip flexibility and decrease his pain to return to his PLOF.       Plan:   OP PT Plan  Treatment/Interventions: Cryotherapy, Education/ Instruction, Hot pack, Manual therapy, Mechanical traction, Neuromuscular re-education, Self care/ home management, Therapeutic activities, Therapeutic exercises  PT Plan: Skilled PT  PT Frequency: 2 times per week  Duration: 12 visits  Onset Date: 04/28/25  Rehab Potential: Good  Plan of Care Agreement: Patient           Subjective  Patient note that he was sore on Saturday and a little bit on Sunday.  Patient decided to play baseball on Sunday.  Patient felt great on Monday.  Patient rates pain at 1-2/10 in his back.  He denies pain in the legs.   "    General     Referred by: Alfonso Ghosh    Precautions: none          Objective:  Not assessed this date    Treatment:  Therapeutic Activity x 8 minutes   Sit to Stands 2 x 10   Squats 2 x 10     Therapeutic Exercises x 37 min  PPT 2 x 10   STKC 3 x 30  DKTC with physioball 2 x 10   Piriformis Stretch 3 x 30\"  Hamstring Stretching in supine 3 x 30\"   Standing Hip Abduction 2 x 10     Manual Therapy x 10 min  MFR of lumbar paraspinals  STM of L glute min    Not Completed this Date   Hip Isometric Abduction 2 x 10   Clamshell 2 x 10   TA with marching - stopped due to pain   Supine Hip Abduction with red t-band 2 x 10   Hip Isometric Adduction 2 x 10   SLR with TA 2 x 10       HEP:  Access Code: [email or web address]  URL: [email or web address]  Date: 05/20/2025  Prepared by: Lindsey Devine    Exercises  - Supine Posterior Pelvic Tilt  - 1 x daily - 7 x weekly - 3 sets - 10 reps  - Hooklying Single Knee to Chest Stretch  - 1 x daily - 7 x weekly - 3 sets - 1 reps - 30 hold  - Supine Piriformis Stretch with Foot on Ground  - 1 x daily - 7 x weekly - 3 sets - 30 hold  - Clamshell  - 1 x daily - 7 x weekly - 2 sets - 10 reps  - Supine Active Straight Leg Raise  - 1 x daily - 7 x weekly - 2 sets - 10 reps    Goals:   Active       PT Problem       HEP       Start:  05/15/25    Expected End:  06/05/25       Patient will be independent with HEP and self management of condition.         Strength       Start:  05/15/25    Expected End:  07/10/25       Patient will increase hip strength to 5/5 on MMT with 0/10 pain to improve low back stability with standing and walking          ROM        Start:  05/15/25    Expected End:  06/05/25       Patient will increase lumbar ROM to WNL with 0/10 pain to functionally roll over in bed and  objects off the floor.           Pain       Start:  05/15/25    Expected End:  06/05/25       Patient will eliminate all radicular pain to bilateral lower extremities with his " daily and recreational activities.          Outcomes       Start:  05/15/25    Expected End:  07/10/25       Patient will improve CHAIM score to <10% to demonstrate an improvement in his pain with daily activities.          Function       Start:  05/15/25    Expected End:  07/10/25       Patient will complete 10 reps of sit to stands with proper body mechanics and 0/10 pain to functionally transition in and out of his desk chair at work.

## 2025-06-04 ENCOUNTER — APPOINTMENT (OUTPATIENT)
Dept: PHYSICAL THERAPY | Facility: CLINIC | Age: 40
End: 2025-06-04
Payer: COMMERCIAL

## 2025-06-06 ENCOUNTER — APPOINTMENT (OUTPATIENT)
Dept: PHYSICAL THERAPY | Facility: CLINIC | Age: 40
End: 2025-06-06
Payer: COMMERCIAL

## 2025-06-11 ENCOUNTER — TREATMENT (OUTPATIENT)
Dept: PHYSICAL THERAPY | Facility: CLINIC | Age: 40
End: 2025-06-11
Payer: COMMERCIAL

## 2025-06-11 ENCOUNTER — APPOINTMENT (OUTPATIENT)
Dept: OTOLARYNGOLOGY | Facility: CLINIC | Age: 40
End: 2025-06-11
Payer: COMMERCIAL

## 2025-06-11 DIAGNOSIS — J39.2 LESION OF THROAT: ICD-10-CM

## 2025-06-11 DIAGNOSIS — J35.8 TONSIL STONE: Primary | ICD-10-CM

## 2025-06-11 DIAGNOSIS — M54.40 LOW BACK PAIN WITH SCIATICA, SCIATICA LATERALITY UNSPECIFIED, UNSPECIFIED BACK PAIN LATERALITY, UNSPECIFIED CHRONICITY: ICD-10-CM

## 2025-06-11 PROCEDURE — 99203 OFFICE O/P NEW LOW 30 MIN: CPT | Performed by: OTOLARYNGOLOGY

## 2025-06-11 PROCEDURE — 1036F TOBACCO NON-USER: CPT | Performed by: OTOLARYNGOLOGY

## 2025-06-11 PROCEDURE — 97140 MANUAL THERAPY 1/> REGIONS: CPT | Mod: GP | Performed by: PHYSICAL THERAPIST

## 2025-06-11 PROCEDURE — 97110 THERAPEUTIC EXERCISES: CPT | Mod: GP | Performed by: PHYSICAL THERAPIST

## 2025-06-11 NOTE — LETTER
"June 11, 2025     Alfonso Ghosh MD  [address]   [address] [address]    Patient: Danny Neff   YOB: 1985   Date of Visit: 6/11/2025       Dear Dr. Alfonso Ghosh MD:    Thank you for referring Danny Neff to me for evaluation. Below are my notes for this consultation.  If you have questions, please do not hesitate to call me. I look forward to following your patient along with you.       Sincerely,     Taylor Benjamin MD      CC: No Recipients  ______________________________________________________________________________________    History Of Present Illness  Danny Neff is a 40 y.o. male presenting with: \"White dot on tonsil\".  He is kindly referred by Dr. Ghosh.    Patient noticed a white spot at his throat about 4 months ago.    On examination he has a submucosal tonsil stone at right tonsil upper pole.  He does not want removal as of now.  It does not hurt him or give him discomfort.    Plan  1-follow-up as needed     Past Medical History  He has a past medical history of Abnormal levels of other serum enzymes (06/17/2022), Chills (without fever) (01/19/2017), COVID-19, Diplopia (03/23/2024), Lactose intolerance, unspecified (04/28/2014), Pain in right shoulder (04/26/2016), Pain, unspecified (01/19/2017), Personal history of other diseases of the respiratory system (01/19/2017), Personal history of other diseases of the respiratory system (06/26/2014), Personal history of other endocrine, nutritional and metabolic disease (05/06/2021), Personal history of other specified conditions (12/10/2014), Radiculopathy, cervical region (11/25/2018), and Radiculopathy, cervical region (11/29/2018).    Surgical History  He has a past surgical history that includes Hernia repair (10/31/2013); Other surgical history (12/16/2021); Other surgical history (12/16/2021); Other surgical history (12/16/2021); Other surgical history (12/16/2021); and Other surgical history " "(12/16/2021).     Social History  He reports that he has never smoked. He has never been exposed to tobacco smoke. He has never used smokeless tobacco. He reports that he does not currently use alcohol. He reports that he does not use drugs.    Family History  Family History[1]     Allergies  Patient has no known allergies.    Review of Systems   None reported     Physical Exam    General appearance: Healthy-appearing, well-nourished, well groomed, in no acute distress.     Head and Face: Atraumatic with no masses, lesions, or scarring.      Salivary glands: No tenderness of the parotid glands or parotid masses.     No tenderness of the submandibular glands or submandibular masses.      Facial strength: Normal strength and symmetry, no synkinesis or facial tic.     Eyes: Conjunctivas look non-hyperemic bilaterally    Ears: Bilaterally ear canals look normal. Tympanic membranes look intact, no hyperemia, fluid or retraction. Hearing grossly normal.      Nose: Mucosa looks normal. No purulent discharge. Septum deviated to left.     Oral Cavity/Mouth: Lips and tongue look normal.     Throat: No postnasal discharge. There is a submucosal tonsil stone at right tonsil upper pole.  He does not want removal as of now.  It does not hurt him or give him discomfort.    Neck: Symmetrical, trachea midline.     Pulmonary: Normal respiratory effort.     Lymphatic: No palpable pathologic lymph nodes at neck.     Neurological/Psychiatric Orientation to person, place, and time: Normal.     Mood and affect: Normal.      Extremities: No clubbing.     Skin: No significant skin lesions were noted at face or neck        Procedure         Last Recorded Vitals  There were no vitals taken for this visit.    Relevant Results    Assessment and Plan:  Danny Neff is a 40 y.o. male presenting with: \"White dot on tonsil\".  He is kindly referred by Dr. Ghosh.    Patient noticed a white spot at his throat about 4 months ago.    On " examination he has a submucosal tonsil stone at right tonsil upper pole.  He does not want removal as of now.  It does not hurt him or give him discomfort.    Plan  1-follow-up as needed    Taylor Benjamin  Otolaryngology - Head & Neck Surgery         [1]  No family history on file.       [1]  No family history on file.

## 2025-06-11 NOTE — PROGRESS NOTES
"History Of Present Illness  Danny Neff is a 40 y.o. male presenting with: \"White dot on tonsil\".  He is kindly referred by Dr. Ghosh.    Patient noticed a white spot at his throat about 4 months ago.    On examination he has a submucosal tonsil stone at right tonsil upper pole.  He does not want removal as of now.  It does not hurt him or give him discomfort.    Plan  1-follow-up as needed     Past Medical History  He has a past medical history of Abnormal levels of other serum enzymes (06/17/2022), Chills (without fever) (01/19/2017), COVID-19, Diplopia (03/23/2024), Lactose intolerance, unspecified (04/28/2014), Pain in right shoulder (04/26/2016), Pain, unspecified (01/19/2017), Personal history of other diseases of the respiratory system (01/19/2017), Personal history of other diseases of the respiratory system (06/26/2014), Personal history of other endocrine, nutritional and metabolic disease (05/06/2021), Personal history of other specified conditions (12/10/2014), Radiculopathy, cervical region (11/25/2018), and Radiculopathy, cervical region (11/29/2018).    Surgical History  He has a past surgical history that includes Hernia repair (10/31/2013); Other surgical history (12/16/2021); Other surgical history (12/16/2021); Other surgical history (12/16/2021); Other surgical history (12/16/2021); and Other surgical history (12/16/2021).     Social History  He reports that he has never smoked. He has never been exposed to tobacco smoke. He has never used smokeless tobacco. He reports that he does not currently use alcohol. He reports that he does not use drugs.    Family History  Family History[1]     Allergies  Patient has no known allergies.    Review of Systems   None reported     Physical Exam    General appearance: Healthy-appearing, well-nourished, well groomed, in no acute distress.     Head and Face: Atraumatic with no masses, lesions, or scarring.      Salivary glands: No tenderness of the " "parotid glands or parotid masses.     No tenderness of the submandibular glands or submandibular masses.      Facial strength: Normal strength and symmetry, no synkinesis or facial tic.     Eyes: Conjunctivas look non-hyperemic bilaterally    Ears: Bilaterally ear canals look normal. Tympanic membranes look intact, no hyperemia, fluid or retraction. Hearing grossly normal.      Nose: Mucosa looks normal. No purulent discharge. Septum deviated to left.     Oral Cavity/Mouth: Lips and tongue look normal.     Throat: No postnasal discharge. There is a submucosal tonsil stone at right tonsil upper pole.  He does not want removal as of now.  It does not hurt him or give him discomfort.    Neck: Symmetrical, trachea midline.     Pulmonary: Normal respiratory effort.     Lymphatic: No palpable pathologic lymph nodes at neck.     Neurological/Psychiatric Orientation to person, place, and time: Normal.     Mood and affect: Normal.      Extremities: No clubbing.     Skin: No significant skin lesions were noted at face or neck        Procedure         Last Recorded Vitals  There were no vitals taken for this visit.    Relevant Results    Assessment and Plan:  Danny Neff is a 40 y.o. male presenting with: \"White dot on tonsil\".  He is kindly referred by Dr. Ghosh.    Patient noticed a white spot at his throat about 4 months ago.    On examination he has a submucosal tonsil stone at right tonsil upper pole.  He does not want removal as of now.  It does not hurt him or give him discomfort.    Plan  1-follow-up as needed    Taylor Benjamin  Otolaryngology - Head & Neck Surgery         [1] No family history on file.    "

## 2025-06-11 NOTE — PROGRESS NOTES
"Physical Therapy    Physical Therapy Treatment    Patient Name: Danny Neff  MRN: [id number]  Encounter Date: 6/11/2025  Visit 5    Payor: KORTNEY / Plan: KORTNEY TRADITIONAL / Product Type: *No Product type* /     NO AUTH, 35.00 CO PAY, 100% COVERAGE, 50V PT/OT/ST, 7500 OOP-NOT MET, KORTNEY     Time Calculation  Start Time: 1430  Stop Time: 1515  Time Calculation (min): 45 min        PT Therapeutic Procedures Time Entry  Therapeutic Exercise Time Entry: 25  Manual Therapy Time Entry: 20          Current Problem  Problem List Items Addressed This Visit    None  Visit Diagnoses         Codes      Low back pain with sciatica, sciatica laterality unspecified, unspecified back pain laterality, unspecified chronicity     M54.40              Assessment:  Patient presents to the clinic with numbness into the leg.  Patient radicular symptoms improve with a neutral spine, but increases with flexion and extension. Patient was able to tolerate a few standing exercises, but he continues to have \"tightness\" in the low back with prolonged standing.  Sciatic nerve glides were initiated and given for home exercises to improve the mobility of the nerve.  Patient will benefit from skilled PT to increase his LE strength, improve his hip flexibility and decrease his pain to return to his PLOF.       Plan:   OP PT Plan  Treatment/Interventions: Cryotherapy, Education/ Instruction, Hot pack, Manual therapy, Mechanical traction, Neuromuscular re-education, Self care/ home management, Therapeutic activities, Therapeutic exercises  PT Plan: Skilled PT  PT Frequency: 2 times per week  Duration: 12 visits  Onset Date: 04/28/25  Rehab Potential: Good  Plan of Care Agreement: Patient           Subjective  Patient notes that he was sick last week so he was not doing to much.  Patient notes that the lower leg and into the foot seem to get numb.  Patient notes that it is worse with sitting and laying down, but the numbness improves with standing " "and walking around.    General     Referred by: Alfonso Ghosh    Precautions: none          Objective:  Not assessed this date    Treatment:  Therapeutic Exercises x 25 min  PPT 3 x 10   Piriformis Stretch 3 x 30\"  Standing Hip Abduction 2 x 10   Squats 2 x 10   Heel Raises 2 x 10   Sciatic Nerve Glides 2 x 10 ea    Manual Therapy x 20 min  MFR of L lower leg  STM of L glute min    Not Completed this Date   Hip Isometric Abduction 2 x 10   Clamshell 2 x 10   TA with marching - stopped due to pain   Supine Hip Abduction with red t-band 2 x 10   Hip Isometric Adduction 2 x 10   SLR with TA 2 x 10   Sit to Stands 2 x 10   STKC 3 x 30  DKTC with physioball 2 x 10   Hamstring Stretching in supine 3 x 30\"       HEP:  Access Code: [email or web address]  URL: [email or web address]  Date: 05/20/2025  Prepared by: Lindsey Devine    Exercises  - Supine Posterior Pelvic Tilt  - 1 x daily - 7 x weekly - 3 sets - 10 reps  - Hooklying Single Knee to Chest Stretch  - 1 x daily - 7 x weekly - 3 sets - 1 reps - 30 hold  - Supine Piriformis Stretch with Foot on Ground  - 1 x daily - 7 x weekly - 3 sets - 30 hold  - Clamshell  - 1 x daily - 7 x weekly - 2 sets - 10 reps  - Supine Active Straight Leg Raise  - 1 x daily - 7 x weekly - 2 sets - 10 reps    Goals:   Active       PT Problem       HEP       Start:  05/15/25    Expected End:  06/05/25       Patient will be independent with HEP and self management of condition.         Strength       Start:  05/15/25    Expected End:  07/10/25       Patient will increase hip strength to 5/5 on MMT with 0/10 pain to improve low back stability with standing and walking          ROM        Start:  05/15/25    Expected End:  06/05/25       Patient will increase lumbar ROM to WNL with 0/10 pain to functionally roll over in bed and  objects off the floor.           Pain       Start:  05/15/25    Expected End:  06/05/25       Patient will eliminate all radicular pain to bilateral " lower extremities with his daily and recreational activities.          Outcomes       Start:  05/15/25    Expected End:  07/10/25       Patient will improve CHAIM score to <10% to demonstrate an improvement in his pain with daily activities.          Function       Start:  05/15/25    Expected End:  07/10/25       Patient will complete 10 reps of sit to stands with proper body mechanics and 0/10 pain to functionally transition in and out of his desk chair at work.

## 2025-06-13 ENCOUNTER — TREATMENT (OUTPATIENT)
Dept: PHYSICAL THERAPY | Facility: CLINIC | Age: 40
End: 2025-06-13
Payer: COMMERCIAL

## 2025-06-13 DIAGNOSIS — M54.40 LOW BACK PAIN WITH SCIATICA, SCIATICA LATERALITY UNSPECIFIED, UNSPECIFIED BACK PAIN LATERALITY, UNSPECIFIED CHRONICITY: ICD-10-CM

## 2025-06-13 PROCEDURE — 97140 MANUAL THERAPY 1/> REGIONS: CPT | Mod: GP | Performed by: PHYSICAL THERAPIST

## 2025-06-13 PROCEDURE — 97110 THERAPEUTIC EXERCISES: CPT | Mod: GP | Performed by: PHYSICAL THERAPIST

## 2025-06-13 NOTE — PROGRESS NOTES
"Physical Therapy    Physical Therapy Treatment    Patient Name: Danny Neff  MRN: [id number]  Encounter Date: 6/13/2025  Visit 6    Payor: KORTNEY / Plan: KORTNEY TRADITIONAL / Product Type: *No Product type* /     NO AUTH, 35.00 CO PAY, 100% COVERAGE, 50V PT/OT/ST, 7500 OOP-NOT MET, KORTNEY     Time Calculation  Start Time: 1300  Stop Time: 1345  Time Calculation (min): 45 min        PT Therapeutic Procedures Time Entry  Therapeutic Exercise Time Entry: 35  Manual Therapy Time Entry: 10          Current Problem  Problem List Items Addressed This Visit    None  Visit Diagnoses         Codes      Low back pain with sciatica, sciatica laterality unspecified, unspecified back pain laterality, unspecified chronicity     M54.40              Assessment:  Patient presents to the clinic with numbness into the leg.  Patient had relief of radicular sx with manual traction.  Patient continues to have less radicular pain with a neutral spine so modified bird dogs were initiated to improve core strength and promote a neutral spine. Patient will benefit from skilled PT to increase his LE strength, improve his hip flexibility and decrease his pain to return to his PLOF.       Plan:   OP PT Plan  Treatment/Interventions: Cryotherapy, Education/ Instruction, Hot pack, Manual therapy, Mechanical traction, Neuromuscular re-education, Self care/ home management, Therapeutic activities, Therapeutic exercises  PT Plan: Skilled PT  PT Frequency: 2 times per week  Duration: 12 visits  Onset Date: 04/28/25  Rehab Potential: Good  Plan of Care Agreement: Patient           Subjective  Patient notes that he was sore after LV, but felt better the next day and today.  Patient rates numbness into the lower leg at 1/10 and notes some tightness into the back.     General     Referred by: Alfonso Ghosh    Precautions: none          Objective:  Not assessed this date    Treatment:  Therapeutic Exercises x 35 min  Piriformis Stretch 3 x 30\"  Squats " "2 x 10   Heel Raises 2 x 10   Toe Raises 2 x 10   Sciatic Nerve Glides 2 x 10 ea  Hamstring stretch 2 x 1' ea  Modified Bird Dog alternating UE 2 x 10  Modified Bird Dog alternating LE 2 x 10    Hip ER/IR AROM 2 x 10 ea      Manual Therapy x 10 min  Lumbar Traction     Not Completed this Date   Hip Isometric Abduction 2 x 10   Clamshell 2 x 10   TA with marching - stopped due to pain   Supine Hip Abduction with red t-band 2 x 10   Hip Isometric Adduction 2 x 10   SLR with TA 2 x 10   Sit to Stands 2 x 10   STKC 3 x 30  DKTC with physioball 2 x 10   Hamstring Stretching in supine 3 x 30\"       HEP:  Access Code: [email or web address]  URL: [email or web address]  Date: 05/20/2025  Prepared by: Lindsey Devine    Exercises  - Supine Posterior Pelvic Tilt  - 1 x daily - 7 x weekly - 3 sets - 10 reps  - Hooklying Single Knee to Chest Stretch  - 1 x daily - 7 x weekly - 3 sets - 1 reps - 30 hold  - Supine Piriformis Stretch with Foot on Ground  - 1 x daily - 7 x weekly - 3 sets - 30 hold  - Clamshell  - 1 x daily - 7 x weekly - 2 sets - 10 reps  - Supine Active Straight Leg Raise  - 1 x daily - 7 x weekly - 2 sets - 10 reps    Goals:   Active       PT Problem       HEP       Start:  05/15/25    Expected End:  06/05/25       Patient will be independent with HEP and self management of condition.         Strength       Start:  05/15/25    Expected End:  07/10/25       Patient will increase hip strength to 5/5 on MMT with 0/10 pain to improve low back stability with standing and walking          ROM        Start:  05/15/25    Expected End:  06/05/25       Patient will increase lumbar ROM to WNL with 0/10 pain to functionally roll over in bed and  objects off the floor.           Pain       Start:  05/15/25    Expected End:  06/05/25       Patient will eliminate all radicular pain to bilateral lower extremities with his daily and recreational activities.          Outcomes       Start:  05/15/25    Expected End: "  07/10/25       Patient will improve CHAIM score to <10% to demonstrate an improvement in his pain with daily activities.          Function       Start:  05/15/25    Expected End:  07/10/25       Patient will complete 10 reps of sit to stands with proper body mechanics and 0/10 pain to functionally transition in and out of his desk chair at work.

## 2025-06-17 ENCOUNTER — TREATMENT (OUTPATIENT)
Dept: PHYSICAL THERAPY | Facility: CLINIC | Age: 40
End: 2025-06-17
Payer: COMMERCIAL

## 2025-06-17 DIAGNOSIS — M54.40 LOW BACK PAIN WITH SCIATICA, SCIATICA LATERALITY UNSPECIFIED, UNSPECIFIED BACK PAIN LATERALITY, UNSPECIFIED CHRONICITY: ICD-10-CM

## 2025-06-17 PROCEDURE — 97110 THERAPEUTIC EXERCISES: CPT | Mod: GP | Performed by: PHYSICAL THERAPIST

## 2025-06-17 NOTE — PROGRESS NOTES
Physical Therapy    Physical Therapy Treatment    Patient Name: Danny Neff  MRN: [id number]  Encounter Date: 6/17/2025  Visit 7    Payor: KORTNEY / Plan: KORTNEY TRADITIONAL / Product Type: *No Product type* /     NO AUTH, 35.00 CO PAY, 100% COVERAGE, 50V PT/OT/ST, 7500 OOP-NOT MET, KORTNEY     Time Calculation  Start Time: 1345  Stop Time: 1415  Time Calculation (min): 30 min        PT Therapeutic Procedures Time Entry  Therapeutic Exercise Time Entry: 25  Manual Therapy Time Entry: 5          Current Problem  Problem List Items Addressed This Visit    None  Visit Diagnoses         Codes      Low back pain with sciatica, sciatica laterality unspecified, unspecified back pain laterality, unspecified chronicity     M54.40              Assessment:  Patient presents to the clinic for numbness into the leg.  Patient continues to be challenged with core strengthening exercises.  He required cues with SLR to activate his TA.  Patient had less back pain and radicular symptoms after cuing.    Patient will benefit from skilled PT to increase his LE strength, improve his hip flexibility and decrease his pain to return to his PLOF.       Plan:   OP PT Plan  Treatment/Interventions: Cryotherapy, Education/ Instruction, Hot pack, Manual therapy, Mechanical traction, Neuromuscular re-education, Self care/ home management, Therapeutic activities, Therapeutic exercises  PT Plan: Skilled PT  PT Frequency: 2 times per week  Duration: 12 visits  Onset Date: 04/28/25  Rehab Potential: Good  Plan of Care Agreement: Patient           Subjective  Patient denies pain radicular pain into the leg currently.  Patient notes that it comes and goes.  Patient reports tightness into his hamstrings and calves.   Patient notes he has to leave early today to  his kids.     General     Referred by: Alfonso Ghosh    Precautions: none          Objective:  Not assessed this date    Treatment:  Therapeutic Exercises x 25 min  PPT 2 x 10  "  Modified Bird Dog alternating UE 1 x 10  Modified Bird Dog alternating LE 2 x 10    Child's Pose Stretch 3 x 30\"  Standing Hip Abduction 2 x 10   Calf Stretch 3 x 20\"   Sciatic Nerve Glides 3 x 10 ea  SLR with TA 2 x 10       Manual Therapy x 5 min  Lumbar Traction     Not Completed this Date   Hip Isometric Abduction 2 x 10   Clamshell 2 x 10   TA with marching - stopped due to pain   Supine Hip Abduction with red t-band 2 x 10   Hip Isometric Adduction 2 x 10   Sit to Stands 2 x 10   STKC 3 x 30  DKTC with physioball 2 x 10   Hamstring Stretching in supine 3 x 30\"  Hip ER/IR AROM 2 x 10 ea   Piriformis Stretch 3 x 30\"  Squats 2 x 10   Heel Raises 2 x 10   Toe Raises 2 x 10       HEP:  Access Code: [email or web address]  URL: [email or web address]  Date: 05/20/2025  Prepared by: Lindsey Devine    Exercises  - Supine Posterior Pelvic Tilt  - 1 x daily - 7 x weekly - 3 sets - 10 reps  - Hooklying Single Knee to Chest Stretch  - 1 x daily - 7 x weekly - 3 sets - 1 reps - 30 hold  - Supine Piriformis Stretch with Foot on Ground  - 1 x daily - 7 x weekly - 3 sets - 30 hold  - Clamshell  - 1 x daily - 7 x weekly - 2 sets - 10 reps  - Supine Active Straight Leg Raise  - 1 x daily - 7 x weekly - 2 sets - 10 reps    Goals:   Active       PT Problem       HEP       Start:  05/15/25    Expected End:  06/05/25       Patient will be independent with HEP and self management of condition.         Strength       Start:  05/15/25    Expected End:  07/10/25       Patient will increase hip strength to 5/5 on MMT with 0/10 pain to improve low back stability with standing and walking          ROM        Start:  05/15/25    Expected End:  06/05/25       Patient will increase lumbar ROM to WNL with 0/10 pain to functionally roll over in bed and  objects off the floor.           Pain       Start:  05/15/25    Expected End:  06/05/25       Patient will eliminate all radicular pain to bilateral lower extremities with his " daily and recreational activities.          Outcomes       Start:  05/15/25    Expected End:  07/10/25       Patient will improve CHAIM score to <10% to demonstrate an improvement in his pain with daily activities.          Function       Start:  05/15/25    Expected End:  07/10/25       Patient will complete 10 reps of sit to stands with proper body mechanics and 0/10 pain to functionally transition in and out of his desk chair at work.

## 2025-06-23 ENCOUNTER — APPOINTMENT (OUTPATIENT)
Dept: PHYSICAL THERAPY | Facility: CLINIC | Age: 40
End: 2025-06-23
Payer: COMMERCIAL

## 2025-06-23 ENCOUNTER — DOCUMENTATION (OUTPATIENT)
Dept: PHYSICAL THERAPY | Facility: CLINIC | Age: 40
End: 2025-06-23
Payer: COMMERCIAL

## 2025-06-23 NOTE — PROGRESS NOTES
Physical Therapy                     Therapy Communication Note    Patient Name: Danny Neff  MRN: [id number]  Today's Date: 6/23/2025     Discipline: Physical Therapy    Missed Visit:   Cancel     Missed Visit Reason:   Patient canceled his PT appointment.  There was no reason provided.     Missed Time: Cancel    Comment:

## 2025-06-25 ENCOUNTER — TREATMENT (OUTPATIENT)
Dept: PHYSICAL THERAPY | Facility: CLINIC | Age: 40
End: 2025-06-25
Payer: COMMERCIAL

## 2025-06-25 DIAGNOSIS — M54.40 LOW BACK PAIN WITH SCIATICA, SCIATICA LATERALITY UNSPECIFIED, UNSPECIFIED BACK PAIN LATERALITY, UNSPECIFIED CHRONICITY: ICD-10-CM

## 2025-06-25 PROCEDURE — 97140 MANUAL THERAPY 1/> REGIONS: CPT | Mod: GP | Performed by: PHYSICAL THERAPIST

## 2025-06-25 PROCEDURE — 97110 THERAPEUTIC EXERCISES: CPT | Mod: GP | Performed by: PHYSICAL THERAPIST

## 2025-06-25 NOTE — PROGRESS NOTES
Physical Therapy    Physical Therapy Treatment    Patient Name: Danny Neff  MRN: [id number]  Encounter Date: 6/25/2025  Visit 8    Payor: KORTNEY / Plan: KORTNEY TRADITIONAL / Product Type: *No Product type* /     NO AUTH, 35.00 CO PAY, 100% COVERAGE, 50V PT/OT/ST, 7500 OOP-NOT MET, KORTNEY     Time Calculation  Start Time: 1300  Stop Time: 1341  Time Calculation (min): 41 min        PT Therapeutic Procedures Time Entry  Therapeutic Exercise Time Entry: 33  Manual Therapy Time Entry: 8          Current Problem  Problem List Items Addressed This Visit    None  Visit Diagnoses         Codes      Low back pain with sciatica, sciatica laterality unspecified, unspecified back pain laterality, unspecified chronicity     M54.40              Assessment:  Patient presents to the clinic for numbness into the leg.  Patient demonstrates improved ability to complete birddogs this date due to improved core strength.  Pallof press was initiated to continue progressing core strength. Patient was challenged with BOSU due to continued weakness and instability in the lower extremities.   Patient will benefit from skilled PT to increase his LE strength, improve his hip flexibility and decrease his pain to return to his PLOF.       Plan:   OP PT Plan  Treatment/Interventions: Cryotherapy, Education/ Instruction, Hot pack, Manual therapy, Mechanical traction, Neuromuscular re-education, Self care/ home management, Therapeutic activities, Therapeutic exercises  PT Plan: Skilled PT  PT Frequency: 2 times per week  Duration: 12 visits  Onset Date: 04/28/25  Rehab Potential: Good  Plan of Care Agreement: Patient           Subjective  Patient notes that he continues to have numbness into the L lower leg, which comes at goes.  It is more prevalent with sitting.  Patient notes the tightness into the calf and hamstring has gotten better.  He has been stretching at home.     General     Referred by: Alfonso Ghosh    Precautions: none       "    Objective:  Not assessed this date    Treatment:  Therapeutic Exercises x 25 min  Bridge 3 x 10   Bird Dog alternating 3 x 10  Child's Pose Stretch 3 x 30\"  Sciatic Nerve Glides 3 x 10 ea  SLR with TA 3 x 10   Piriformis Stretch 3 x 30\"  Pallof Press with GTB 2 x 10   BOSU step ups 2 x 10     Manual Therapy x 8 min  Lumbar Traction     Not Completed this Date   Hip Isometric Abduction 2 x 10   Clamshell 2 x 10   TA with marching - stopped due to pain   Supine Hip Abduction with red t-band 2 x 10   Hip Isometric Adduction 2 x 10   Sit to Stands 2 x 10   STKC 3 x 30  DKTC with physioball 2 x 10   Hamstring Stretching in supine 3 x 30\"  Hip ER/IR AROM 2 x 10 ea   Squats 2 x 10   Heel Raises 2 x 10   Toe Raises 2 x 10   Standing Hip Abduction 2 x 10   Calf Stretch 3 x 20\"       HEP:  Access Code: [email or web address]  URL: [email or web address]  Date: 05/20/2025  Prepared by: Lindsey Devine    Exercises  - Supine Posterior Pelvic Tilt  - 1 x daily - 7 x weekly - 3 sets - 10 reps  - Hooklying Single Knee to Chest Stretch  - 1 x daily - 7 x weekly - 3 sets - 1 reps - 30 hold  - Supine Piriformis Stretch with Foot on Ground  - 1 x daily - 7 x weekly - 3 sets - 30 hold  - Clamshell  - 1 x daily - 7 x weekly - 2 sets - 10 reps  - Supine Active Straight Leg Raise  - 1 x daily - 7 x weekly - 2 sets - 10 reps    Goals:   Active       PT Problem       HEP       Start:  05/15/25    Expected End:  06/05/25       Patient will be independent with HEP and self management of condition.         Strength       Start:  05/15/25    Expected End:  07/10/25       Patient will increase hip strength to 5/5 on MMT with 0/10 pain to improve low back stability with standing and walking          ROM        Start:  05/15/25    Expected End:  06/05/25       Patient will increase lumbar ROM to WNL with 0/10 pain to functionally roll over in bed and  objects off the floor.           Pain       Start:  05/15/25    Expected End:  " 06/05/25       Patient will eliminate all radicular pain to bilateral lower extremities with his daily and recreational activities.          Outcomes       Start:  05/15/25    Expected End:  07/10/25       Patient will improve CHAIM score to <10% to demonstrate an improvement in his pain with daily activities.          Function       Start:  05/15/25    Expected End:  07/10/25       Patient will complete 10 reps of sit to stands with proper body mechanics and 0/10 pain to functionally transition in and out of his desk chair at work.

## 2025-06-30 ENCOUNTER — TREATMENT (OUTPATIENT)
Dept: PHYSICAL THERAPY | Facility: CLINIC | Age: 40
End: 2025-06-30
Payer: COMMERCIAL

## 2025-06-30 DIAGNOSIS — M54.40 LOW BACK PAIN WITH SCIATICA, SCIATICA LATERALITY UNSPECIFIED, UNSPECIFIED BACK PAIN LATERALITY, UNSPECIFIED CHRONICITY: ICD-10-CM

## 2025-06-30 DIAGNOSIS — R29.898 WEAKNESS OF BOTH LOWER EXTREMITIES: Primary | ICD-10-CM

## 2025-06-30 PROCEDURE — 97110 THERAPEUTIC EXERCISES: CPT | Mod: GP | Performed by: PHYSICAL THERAPIST

## 2025-06-30 NOTE — PROGRESS NOTES
Physical Therapy    Physical Therapy Treatment    Patient Name: Danny Neff  MRN: [id number]  Encounter Date: 6/30/2025  Visit 9    Payor: KORTNEY / Plan: KORTNEY TRADITIONAL / Product Type: *No Product type* /     NO AUTH, 35.00 CO PAY, 100% COVERAGE, 50V PT/OT/ST, 7500 OOP-NOT MET, KORTNEY     Time Calculation  Start Time: 1221  Stop Time: 1255  Time Calculation (min): 34 min        PT Therapeutic Procedures Time Entry  Therapeutic Exercise Time Entry: 29  Manual Therapy Time Entry: 5          Current Problem  Problem List Items Addressed This Visit    None  Visit Diagnoses         Codes      Weakness of both lower extremities    -  Primary R29.898      Low back pain with sciatica, sciatica laterality unspecified, unspecified back pain laterality, unspecified chronicity     M54.40                Assessment:  Patient presents to the clinic for low back pain.  Patient was challenged this date due to an increase in pain.  Patient was able to tolerate stretches and simple strengthening exercises without an increase in pain.  Patient required cues to maintain a posterior pelvic tilt when lifting and lower his leg to prevent low back pain.   Patient will benefit from skilled PT to increase his LE strength, improve his hip flexibility and decrease his pain to return to his PLOF.       Plan:   OP PT Plan  Treatment/Interventions: Cryotherapy, Education/ Instruction, Hot pack, Manual therapy, Mechanical traction, Neuromuscular re-education, Self care/ home management, Therapeutic activities, Therapeutic exercises  PT Plan: Skilled PT  PT Frequency: 2 times per week  Duration: 12 visits  Onset Date: 04/28/25  Rehab Potential: Good  Plan of Care Agreement: Patient           Subjective  Patient notes that the pain increased after playing baseball yesterday morning.  Patient felt a little radicular pain to the L posterior thigh.  Patient notes that his pain is about an 8/10 this morning, but only in the back.     Patient has  "to leave early to  his kids    General     Referred by: Alfonso Ghosh    Precautions: none          Objective:  Not assessed this date    Treatment:  Therapeutic Exercises x 33 min  PPT 3 x 10   Child's Pose Stretch 3 x 30\"  Piriformis Stretch 3 x 30\"  DKTC with physioball 2 x 10   Supine Hip Abduction with red t-band 2 x 10   Hip Isometric Adduction 2 x 10     Manual Therapy x 5 min  Lumbar Traction     Not Completed this Date   Clamshell 2 x 10   TA with marching - stopped due to pain   Sit to Stands 2 x 10   STKC 3 x 30  Hamstring Stretching in supine 3 x 30\"  Hip ER/IR AROM 2 x 10 ea   Squats 2 x 10   Heel Raises 2 x 10   Toe Raises 2 x 10   Standing Hip Abduction 2 x 10   Calf Stretch 3 x 20\"   Pallof Press with GTB 2 x 10   BOSU step ups 2 x 10   Sciatic Nerve Glides 3 x 10 ea  SLR with TA 3 x 10       HEP:  Access Code: [email or web address]  URL: [email or web address]  Date: 05/20/2025  Prepared by: Lindsey Devine    Exercises  - Supine Posterior Pelvic Tilt  - 1 x daily - 7 x weekly - 3 sets - 10 reps  - Hooklying Single Knee to Chest Stretch  - 1 x daily - 7 x weekly - 3 sets - 1 reps - 30 hold  - Supine Piriformis Stretch with Foot on Ground  - 1 x daily - 7 x weekly - 3 sets - 30 hold  - Clamshell  - 1 x daily - 7 x weekly - 2 sets - 10 reps  - Supine Active Straight Leg Raise  - 1 x daily - 7 x weekly - 2 sets - 10 reps    Goals:   Active       PT Problem       HEP       Start:  05/15/25    Expected End:  06/05/25       Patient will be independent with HEP and self management of condition.         Strength       Start:  05/15/25    Expected End:  07/10/25       Patient will increase hip strength to 5/5 on MMT with 0/10 pain to improve low back stability with standing and walking          ROM        Start:  05/15/25    Expected End:  06/05/25       Patient will increase lumbar ROM to WNL with 0/10 pain to functionally roll over in bed and  objects off the floor.           Pain  "      Start:  05/15/25    Expected End:  06/05/25       Patient will eliminate all radicular pain to bilateral lower extremities with his daily and recreational activities.          Outcomes       Start:  05/15/25    Expected End:  07/10/25       Patient will improve CHAIM score to <10% to demonstrate an improvement in his pain with daily activities.          Function       Start:  05/15/25    Expected End:  07/10/25       Patient will complete 10 reps of sit to stands with proper body mechanics and 0/10 pain to functionally transition in and out of his desk chair at work.

## 2025-07-10 ENCOUNTER — APPOINTMENT (OUTPATIENT)
Dept: OTOLARYNGOLOGY | Facility: CLINIC | Age: 40
End: 2025-07-10
Payer: COMMERCIAL

## 2025-07-10 ENCOUNTER — CLINICAL SUPPORT (OUTPATIENT)
Dept: AUDIOLOGY | Facility: CLINIC | Age: 40
End: 2025-07-10
Payer: COMMERCIAL

## 2025-07-10 VITALS — WEIGHT: 233 LBS | BODY MASS INDEX: 32.62 KG/M2 | HEIGHT: 71 IN

## 2025-07-10 DIAGNOSIS — H93.13 TINNITUS OF BOTH EARS: ICD-10-CM

## 2025-07-10 DIAGNOSIS — M26.609 TMJ DYSFUNCTION: Primary | ICD-10-CM

## 2025-07-10 DIAGNOSIS — H93.13 TINNITUS OF BOTH EARS: Primary | ICD-10-CM

## 2025-07-10 DIAGNOSIS — F45.8 BRUXISM: ICD-10-CM

## 2025-07-10 DIAGNOSIS — G44.219 EPISODIC TENSION-TYPE HEADACHE, NOT INTRACTABLE: ICD-10-CM

## 2025-07-10 PROCEDURE — 3008F BODY MASS INDEX DOCD: CPT | Performed by: NURSE PRACTITIONER

## 2025-07-10 PROCEDURE — 92557 COMPREHENSIVE HEARING TEST: CPT | Performed by: AUDIOLOGIST

## 2025-07-10 PROCEDURE — 99213 OFFICE O/P EST LOW 20 MIN: CPT | Performed by: NURSE PRACTITIONER

## 2025-07-10 PROCEDURE — 1036F TOBACCO NON-USER: CPT | Performed by: NURSE PRACTITIONER

## 2025-07-10 PROCEDURE — 92550 TYMPANOMETRY & REFLEX THRESH: CPT | Mod: 52 | Performed by: AUDIOLOGIST

## 2025-07-10 NOTE — PROGRESS NOTES
"AUDIOLOGY AUDIOMETRIC EVALUATION      Name:  Danny Neff   :  1985  Age:  40 y.o.  Date of Evaluation:  7/10/2025    Time: 3230-1316    IMPRESSIONS     Hearing sensitivity within normal limits for 125-8000 Hz. in both ears.  Word recognition in quiet is excellent in both ears.  Tympanometry indicates normal middle ear pressure and tympanic membrane mobility in both ears.     Today's test results are normal hearing sensitivity.     Amplification needs: Amplification is not warranted at this time.    RECOMMENDATIONS     Follow up with otolaryngology as scheduled  Retest hearing levels in conjunction with otological management    HISTORY     Reason for visit:  Mr. Neff is seen today for a follow-up audiologic evaluation in conjunction with an evaluation with Tracey Dove APRN.CNP; See same day encounter in the Ears Nose and Throat (ENT) department for additional information. Previous audio was performed on 22 and revealed hearing sensitivity within normal limits bilaterally. Mr. Neff reports history of periodic tinnitus lasting a few seconds in duration.  However, last week he reports tinnitus lasting a day and half.  He noted it more when in quiet and when laying down.  He notes periodic ear popping.  Please see medical records for complete history.    Change in Hearing: denied  Difficult listening environments: none  Tinnitus: yes in both ears ; intermittent  Otalgia: denied  Aural Pressure/Fullness: denied  Recent Ear Infections/Illness: denied  Otorrhea: denied  Dizziness: denied  History of Ear Surgeries: denied  History of Noise Exposure: denied  Family History of Hearing Loss: denied  Hearing Aid Use: None  Other Significant History: denied  Falls within the last year: denied    EVALUATION     See scanned audiogram in \"Media\"     TEST RESULTS     Otoscopic Evaluation:  Right Ear: Ear canal clear, tympanic membrane visualized.  Left Ear: Ear canal clear, tympanic membrane " visualized.    Tympanometry (226 Hz):  Right Ear: Type A, middle ear pressure and tympanic membrane compliance within normal limits.   Left Ear: Type A, middle ear pressure and tympanic membrane compliance within normal limits.     Acoustic Reflexes:   Right Ear: (Ipsilateral) Responses present at expected levels for 500-2000 and 500-4000 Hz.  Left Ear: (Ipsilateral) Responses present at expected levels for 500-2000 and 500-4000 Hz.    Test technique:  Pure Tone Audiometry via headphones  Reliability: Good    Pure Tone Audiometry:    Right Ear: Hearing sensitivity within normal limits for 125-8000 Hz.  Left Ear: Hearing sensitivity within normal limits for 125-8000 Hz.    Speech Audiometry:   Right Ear:  Speech Reception Threshold (SRT) was obtained at 0 dB HL. Word Recognition scores were excellent (100%) in quiet when words were presented at 40 dB HL. These results are based on [facility] Auditory Test No.6 (NU-6) Ordered by difficulty (N=10).   Left Ear:  Speech Reception Threshold (SRT) was obtained at 0 dB HL. Word Recognition scores were excellent (100%) in quiet when words were presented at 40 dB HL. These results are based on [facility] Auditory Test No.6 (NU-6) Ordered by difficulty (N=10).     Comparison of today's results with previous test results: No change in hearing since last evaluation on 8/1/22         PATIENT EDUCATION     Discussed results and recommendations with Mr. Neff. Questions were addressed and the patient was encouraged to contact our department at [phone] should concerns arise.       Dawn Kyle, CCC-A  Senior Clinical Audiologist      Degree of   Hearing Sensitivity dB Range   Within Normal Limits (WNL) 0 - 20   Slight 25   Mild 26 - 40   Moderate 41 - 55   Moderately-Severe 56 - 70   Severe 71 - 90   Profound 91 +     Key   CHL Conductive Hearing Loss   ECV Ear Canal Volume   HA Hearing Aid   NIHL Noise-Induced Hearing Loss   PTA Pure Tone  Average   SNHL Sensorineural Hearing Loss   TM Tympanic Membrane   WNL Within Normal Limits

## 2025-07-10 NOTE — PROGRESS NOTES
Subjective   Patient ID: Danny Neff is a 40 y.o. male who presents for Tinnitus (Recurrent and ear popping ).  HPI  This patient presents for a follow-up visit.  Today, he complains of intermittent bilateral tinnitus.  He reports this has been present for many years, but it has increased in frequency and severity.  Previously, he would hear it for several seconds then it would dissipate.  Now, he cannot hear it for several seconds but sometimes it a different pitch will last hours.  He notices this more when going to bed or in a quiet environment.  He denies any hearing loss, otalgia, otorrhea.  He also endorses bruxism and tension headaches.  Review of Systems  A comprehensive or 10 points review of the patient's constitutional, neurological, HEENT, pulmonary, cardiovascular and genito-urinary systems showed only those mentioned in history of present illness.    Objective   Physical Exam  Constitutional: no fever, chills, weight loss or weight gain   General appearance: Appears well, well-nourished, well groomed. No acute distress.   Communication: Normal communication   Psychiatric: Oriented to person, place and time. Normal mood and affect.   Neurologic: Cranial nerves II-XII grossly intact and symmetric bilaterally.   Head and Face:   Head: Atraumatic with no masses, lesions or scarring.   Face: Normal symmetry, no paralysis, synkinesis or facial tic. No scars or deformities.   TMJ: Positive trismus and crepitus  Eyes: Conjunctiva not edematous or erythematous   Ears: External inspection of ears with no deformity, scars or masses. Bilateral EACs clear and bilateral TMs intact with no signs of effusions   Nose: External inspection of nose: No nasal lesions, lacerations or scars.   Oral Cavity/Mouth: Oral cavity and oropharynx mucosa moist and pink   Neck: Normal appearing, symmetric, trachea midline.   Cardiovascular: Examination of peripheral vascular system shows no clubbing or cyanosis.   Respiratory: No  respiratory distress increased work of breathing. Inspection of the chest with symmetric chest expansion and normal respiratory effort.   Skin: No rashes in the head or neck    My interpretation of the audiogram done today is normal hearing with excellent word recognition scores and normal tympanograms bilaterally.    Assessment/Plan       This patient presents for subsequent evaluation of chronic acquired bilateral subjective tinnitus, TMJ dysfunction, tension headaches and bruxism.    Reassurance given that otologic exam and audiogram today are both normal.  I believe his tinnitus and headaches are due to chronic TMJ dysfunction/bruxism.  I recommended comfort measures and physical therapy.  Patient is in agreement with the plan.  All questions were answered to patient's satisfaction.    This note was created using speech recognition transcription software. Despite proofreading, several typographical errors might be present that might affect the meaning of the content. Please call with any questions.         SAMUEL Nolasco-CNP 07/10/25 2:39 PM

## 2025-08-18 ENCOUNTER — APPOINTMENT (OUTPATIENT)
Dept: NEUROLOGY | Facility: CLINIC | Age: 40
End: 2025-08-18
Payer: COMMERCIAL

## 2025-08-18 VITALS
DIASTOLIC BLOOD PRESSURE: 94 MMHG | WEIGHT: 225 LBS | HEIGHT: 71 IN | RESPIRATION RATE: 18 BRPM | SYSTOLIC BLOOD PRESSURE: 135 MMHG | HEART RATE: 70 BPM | BODY MASS INDEX: 31.5 KG/M2

## 2025-08-18 DIAGNOSIS — G61.82 MULTIFOCAL MOTOR NEUROPATHY (MULTI): Primary | ICD-10-CM

## 2025-08-18 PROCEDURE — 1036F TOBACCO NON-USER: CPT | Performed by: PSYCHIATRY & NEUROLOGY

## 2025-08-18 PROCEDURE — 99214 OFFICE O/P EST MOD 30 MIN: CPT | Performed by: PSYCHIATRY & NEUROLOGY

## 2025-08-18 PROCEDURE — 3008F BODY MASS INDEX DOCD: CPT | Performed by: PSYCHIATRY & NEUROLOGY

## 2025-08-18 ASSESSMENT — PAIN SCALES - GENERAL: PAINLEVEL_OUTOF10: 0-NO PAIN

## 2025-08-19 ENCOUNTER — APPOINTMENT (OUTPATIENT)
Dept: PRIMARY CARE | Facility: CLINIC | Age: 40
End: 2025-08-19
Payer: COMMERCIAL

## 2025-08-19 VITALS
TEMPERATURE: 98.9 F | BODY MASS INDEX: 32.28 KG/M2 | SYSTOLIC BLOOD PRESSURE: 137 MMHG | WEIGHT: 230.6 LBS | OXYGEN SATURATION: 96 % | HEIGHT: 71 IN | HEART RATE: 71 BPM | DIASTOLIC BLOOD PRESSURE: 83 MMHG

## 2025-08-19 DIAGNOSIS — G61.82 MULTIFOCAL MOTOR NEUROPATHY (MULTI): ICD-10-CM

## 2025-08-19 DIAGNOSIS — R42 LIGHTHEADEDNESS: Primary | ICD-10-CM

## 2025-08-19 PROBLEM — N41.9 PROSTATITIS: Status: RESOLVED | Noted: 2025-05-15 | Resolved: 2025-08-19

## 2025-08-19 LAB
ALBUMIN SERPL-MCNC: 4.8 G/DL (ref 3.6–5.1)
BUN SERPL-MCNC: 13 MG/DL (ref 7–25)
BUN/CREAT SERPL: NORMAL (CALC) (ref 6–22)
CALCIUM SERPL-MCNC: 9.4 MG/DL (ref 8.6–10.3)
CHLORIDE SERPL-SCNC: 101 MMOL/L (ref 98–110)
CO2 SERPL-SCNC: 28 MMOL/L (ref 20–32)
CREAT SERPL-MCNC: 0.94 MG/DL (ref 0.6–1.26)
EGFRCR SERPLBLD CKD-EPI 2021: 106 ML/MIN/1.73M2
GLUCOSE SERPL-MCNC: 98 MG/DL (ref 65–139)
PHOSPHATE SERPL-MCNC: 3.8 MG/DL (ref 2.5–4.5)
POTASSIUM SERPL-SCNC: 4.6 MMOL/L (ref 3.5–5.3)
SODIUM SERPL-SCNC: 136 MMOL/L (ref 135–146)

## 2025-08-19 PROCEDURE — 99214 OFFICE O/P EST MOD 30 MIN: CPT | Performed by: STUDENT IN AN ORGANIZED HEALTH CARE EDUCATION/TRAINING PROGRAM

## 2025-08-19 PROCEDURE — 1036F TOBACCO NON-USER: CPT | Performed by: STUDENT IN AN ORGANIZED HEALTH CARE EDUCATION/TRAINING PROGRAM

## 2025-08-19 PROCEDURE — 3008F BODY MASS INDEX DOCD: CPT | Performed by: STUDENT IN AN ORGANIZED HEALTH CARE EDUCATION/TRAINING PROGRAM

## 2025-08-19 ASSESSMENT — PATIENT HEALTH QUESTIONNAIRE - PHQ9
1. LITTLE INTEREST OR PLEASURE IN DOING THINGS: NOT AT ALL
2. FEELING DOWN, DEPRESSED OR HOPELESS: NOT AT ALL
SUM OF ALL RESPONSES TO PHQ9 QUESTIONS 1 AND 2: 0
SUM OF ALL RESPONSES TO PHQ9 QUESTIONS 1 AND 2: 0
2. FEELING DOWN, DEPRESSED OR HOPELESS: NOT AT ALL
1. LITTLE INTEREST OR PLEASURE IN DOING THINGS: NOT AT ALL

## 2025-08-19 ASSESSMENT — ENCOUNTER SYMPTOMS
DIZZINESS: 0
ABDOMINAL PAIN: 0
LOSS OF SENSATION IN FEET: 0
CHILLS: 0
LIGHT-HEADEDNESS: 0
UNEXPECTED WEIGHT CHANGE: 0
DEPRESSION: 0
SHORTNESS OF BREATH: 0
RHINORRHEA: 0
OCCASIONAL FEELINGS OF UNSTEADINESS: 0
FEVER: 0
EYE PAIN: 0
HEMATURIA: 0

## 2025-08-21 ENCOUNTER — HOSPITAL ENCOUNTER (EMERGENCY)
Facility: HOSPITAL | Age: 40
Discharge: HOME | End: 2025-08-21
Payer: COMMERCIAL

## 2025-08-21 ENCOUNTER — APPOINTMENT (OUTPATIENT)
Dept: RADIOLOGY | Facility: HOSPITAL | Age: 40
End: 2025-08-21
Payer: COMMERCIAL

## 2025-08-21 VITALS
DIASTOLIC BLOOD PRESSURE: 108 MMHG | OXYGEN SATURATION: 96 % | BODY MASS INDEX: 30.8 KG/M2 | WEIGHT: 220 LBS | SYSTOLIC BLOOD PRESSURE: 153 MMHG | HEIGHT: 71 IN | RESPIRATION RATE: 18 BRPM | HEART RATE: 102 BPM | TEMPERATURE: 98.6 F

## 2025-08-21 DIAGNOSIS — M79.661 PAIN OF RIGHT CALF: Primary | ICD-10-CM

## 2025-08-21 DIAGNOSIS — T14.8XXA MUSCLE TEAR: ICD-10-CM

## 2025-08-21 PROCEDURE — 73610 X-RAY EXAM OF ANKLE: CPT | Mod: RIGHT SIDE | Performed by: RADIOLOGY

## 2025-08-21 PROCEDURE — 73590 X-RAY EXAM OF LOWER LEG: CPT | Mod: RT

## 2025-08-21 PROCEDURE — 73610 X-RAY EXAM OF ANKLE: CPT | Mod: RT

## 2025-08-21 PROCEDURE — 99284 EMERGENCY DEPT VISIT MOD MDM: CPT

## 2025-08-21 PROCEDURE — 2500000001 HC RX 250 WO HCPCS SELF ADMINISTERED DRUGS (ALT 637 FOR MEDICARE OP): Performed by: NURSE PRACTITIONER

## 2025-08-21 PROCEDURE — 73590 X-RAY EXAM OF LOWER LEG: CPT | Mod: RIGHT SIDE | Performed by: RADIOLOGY

## 2025-08-21 RX ORDER — IBUPROFEN 600 MG/1
600 TABLET, FILM COATED ORAL EVERY 8 HOURS PRN
Qty: 30 TABLET | Refills: 0 | Status: SHIPPED | OUTPATIENT
Start: 2025-08-21

## 2025-08-21 RX ORDER — IBUPROFEN 600 MG/1
600 TABLET, FILM COATED ORAL ONCE
Status: COMPLETED | OUTPATIENT
Start: 2025-08-21 | End: 2025-08-21

## 2025-08-21 RX ADMIN — IBUPROFEN 600 MG: 600 TABLET ORAL at 20:10

## 2025-08-21 ASSESSMENT — PAIN SCALES - GENERAL
PAINLEVEL_OUTOF10: 6
PAINLEVEL_OUTOF10: 6

## 2025-08-21 ASSESSMENT — PAIN - FUNCTIONAL ASSESSMENT
PAIN_FUNCTIONAL_ASSESSMENT: 0-10
PAIN_FUNCTIONAL_ASSESSMENT: 0-10

## 2025-08-21 ASSESSMENT — PAIN DESCRIPTION - LOCATION
LOCATION: LEG
LOCATION: LEG

## 2025-08-21 ASSESSMENT — PAIN DESCRIPTION - ORIENTATION
ORIENTATION: RIGHT
ORIENTATION: RIGHT

## 2025-08-22 ENCOUNTER — DOCUMENTATION (OUTPATIENT)
Dept: PHYSICAL THERAPY | Facility: CLINIC | Age: 40
End: 2025-08-22
Payer: COMMERCIAL

## 2025-08-25 ENCOUNTER — APPOINTMENT (OUTPATIENT)
Dept: ORTHOPEDIC SURGERY | Facility: CLINIC | Age: 40
End: 2025-08-25
Payer: COMMERCIAL

## 2025-08-25 ENCOUNTER — OFFICE VISIT (OUTPATIENT)
Dept: ORTHOPEDIC SURGERY | Facility: HOSPITAL | Age: 40
End: 2025-08-25
Payer: COMMERCIAL

## 2025-08-25 DIAGNOSIS — S86.819A STRAIN OF CALF MUSCLE, INITIAL ENCOUNTER: ICD-10-CM

## 2025-08-25 PROCEDURE — 99204 OFFICE O/P NEW MOD 45 MIN: CPT | Performed by: SPECIALIST/TECHNOLOGIST

## 2025-08-25 PROCEDURE — 1036F TOBACCO NON-USER: CPT | Performed by: SPECIALIST/TECHNOLOGIST

## 2025-08-25 PROCEDURE — L3332 SHOE LIFTS TAPERED TO ONE-HA: HCPCS | Performed by: SPECIALIST/TECHNOLOGIST

## 2025-08-25 PROCEDURE — 99213 OFFICE O/P EST LOW 20 MIN: CPT

## 2025-08-25 RX ORDER — MELOXICAM 15 MG/1
15 TABLET ORAL DAILY
Qty: 14 TABLET | Refills: 0 | Status: SHIPPED | OUTPATIENT
Start: 2025-08-25 | End: 2025-09-08

## 2025-08-25 ASSESSMENT — PAIN - FUNCTIONAL ASSESSMENT: PAIN_FUNCTIONAL_ASSESSMENT: 0-10

## 2025-08-25 ASSESSMENT — PAIN SCALES - GENERAL: PAINLEVEL_OUTOF10: 0 - NO PAIN

## 2025-08-25 ASSESSMENT — PAIN DESCRIPTION - DESCRIPTORS: DESCRIPTORS: SHARP;SHOOTING;BURNING
